# Patient Record
Sex: FEMALE | Race: OTHER | HISPANIC OR LATINO | Employment: UNEMPLOYED | URBAN - METROPOLITAN AREA
[De-identification: names, ages, dates, MRNs, and addresses within clinical notes are randomized per-mention and may not be internally consistent; named-entity substitution may affect disease eponyms.]

---

## 2023-09-30 ENCOUNTER — HOSPITAL ENCOUNTER (EMERGENCY)
Facility: HOSPITAL | Age: 28
Discharge: LEFT BEFORE DISCHARGE | End: 2023-10-01
Attending: EMERGENCY MEDICINE
Payer: MEDICAID

## 2023-09-30 VITALS
DIASTOLIC BLOOD PRESSURE: 58 MMHG | TEMPERATURE: 98.2 F | BODY MASS INDEX: 26.58 KG/M2 | SYSTOLIC BLOOD PRESSURE: 115 MMHG | WEIGHT: 150 LBS | HEIGHT: 63 IN | OXYGEN SATURATION: 98 % | HEART RATE: 76 BPM | RESPIRATION RATE: 18 BRPM

## 2023-09-30 DIAGNOSIS — N93.9 VAGINAL BLEEDING: Primary | ICD-10-CM

## 2023-09-30 PROCEDURE — 99283 EMERGENCY DEPT VISIT LOW MDM: CPT | Performed by: EMERGENCY MEDICINE

## 2023-09-30 ASSESSMENT — COLUMBIA-SUICIDE SEVERITY RATING SCALE - C-SSRS
1. IN THE PAST MONTH, HAVE YOU WISHED YOU WERE DEAD OR WISHED YOU COULD GO TO SLEEP AND NOT WAKE UP?: NO
2. HAVE YOU ACTUALLY HAD ANY THOUGHTS OF KILLING YOURSELF?: NO
6. HAVE YOU EVER DONE ANYTHING, STARTED TO DO ANYTHING, OR PREPARED TO DO ANYTHING TO END YOUR LIFE?: NO

## 2023-10-01 LAB
ABO GROUP (TYPE) IN BLOOD: NORMAL
ANTIBODY SCREEN: NORMAL
B-HCG SERPL-ACNC: 1151 MIU/ML
BASOPHILS # BLD AUTO: 0.05 X10*3/UL (ref 0–0.1)
BASOPHILS NFR BLD AUTO: 0.5 %
EOSINOPHIL # BLD AUTO: 0.1 X10*3/UL (ref 0–0.7)
EOSINOPHIL NFR BLD AUTO: 1.1 %
ERYTHROCYTE [DISTWIDTH] IN BLOOD BY AUTOMATED COUNT: 12.9 % (ref 11.5–14.5)
HCT VFR BLD AUTO: 38.9 % (ref 36–46)
HGB BLD-MCNC: 13.2 G/DL (ref 12–16)
IMM GRANULOCYTES # BLD AUTO: 0.02 X10*3/UL (ref 0–0.7)
IMM GRANULOCYTES NFR BLD AUTO: 0.2 % (ref 0–0.9)
LYMPHOCYTES # BLD AUTO: 4.07 X10*3/UL (ref 1.2–4.8)
LYMPHOCYTES NFR BLD AUTO: 43 %
MCH RBC QN AUTO: 31.3 PG (ref 26–34)
MCHC RBC AUTO-ENTMCNC: 33.9 G/DL (ref 32–36)
MCV RBC AUTO: 92 FL (ref 80–100)
MONOCYTES # BLD AUTO: 0.56 X10*3/UL (ref 0.1–1)
MONOCYTES NFR BLD AUTO: 5.9 %
NEUTROPHILS # BLD AUTO: 4.66 X10*3/UL (ref 1.2–7.7)
NEUTROPHILS NFR BLD AUTO: 49.3 %
NRBC BLD-RTO: 0 /100 WBCS (ref 0–0)
PLATELET # BLD AUTO: 244 X10*3/UL (ref 150–450)
PMV BLD AUTO: 10.6 FL (ref 7.5–11.5)
RBC # BLD AUTO: 4.22 X10*6/UL (ref 4–5.2)
RH FACTOR (ANTIGEN D): NORMAL
WBC # BLD AUTO: 9.5 X10*3/UL (ref 4.4–11.3)

## 2023-10-01 PROCEDURE — 36415 COLL VENOUS BLD VENIPUNCTURE: CPT

## 2023-10-01 PROCEDURE — 86850 RBC ANTIBODY SCREEN: CPT

## 2023-10-01 PROCEDURE — 85025 COMPLETE CBC W/AUTO DIFF WBC: CPT

## 2023-10-01 PROCEDURE — 84702 CHORIONIC GONADOTROPIN TEST: CPT

## 2023-10-01 NOTE — ED PROVIDER NOTES
HPI   Chief Complaint   Patient presents with    Vaginal Bleeding     RECENT MISCARRIAGE LAST WEEK. PT HAS CONSISTENT BLEEDING. DENIES DIZZINESS, SOB, CP.        Patient is a 28-year-old female who is presenting today for vaginal bleeding.  Patient was diagnosed with a miscarriage a week prior.  At that time her beta hCG was over 5000 and ultrasound showed a nonviable pregnancy.  She is reporting that her vaginal bleeding has persisted since then.  She denies abdominal cramping.  Denies abdominal trauma.  She notes that she has not been able to follow-up with OB/GYN since the miscarriage.  She has not received additional hCG testing.  She also notes that she is suffering from homelessness.  She has been dealing with food insecurity as well.      History provided by:  Patient                      No data recorded                Patient History   Past Medical History:   Diagnosis Date    Supervision of pregnancy with grand multiparity, third trimester 2019    High risk multigravida in third trimester    Supervision of pregnancy with history of pre-term labor, unspecified trimester 2014    Previous  delivery, antepartum     Past Surgical History:   Procedure Laterality Date    OTHER SURGICAL HISTORY  2014    Reported Prior Surgical / Procedural History     No family history on file.  Social History     Tobacco Use    Smoking status: Not on file    Smokeless tobacco: Not on file   Substance Use Topics    Alcohol use: Not on file    Drug use: Not on file       Physical Exam   ED Triage Vitals [23 2203]   Temp Heart Rate Resp BP   36.8 °C (98.2 °F) 76 18 115/58      SpO2 Temp src Heart Rate Source Patient Position   98 % -- -- --      BP Location FiO2 (%)     -- --       Physical Exam  Constitutional:       General: She is not in acute distress.  HENT:      Head: Normocephalic.   Cardiovascular:      Rate and Rhythm: Normal rate.   Pulmonary:      Effort: Pulmonary effort is normal. No  respiratory distress.   Abdominal:      General: Abdomen is flat. There is no distension.      Tenderness: There is no abdominal tenderness. There is no guarding.   Musculoskeletal:         General: Normal range of motion.   Skin:     General: Skin is dry.   Neurological:      Mental Status: She is alert and oriented to person, place, and time. Mental status is at baseline.         ED Course & MDM   Diagnoses as of 10/01/23 0255   Vaginal bleeding       Medical Decision Making  Patient is a 28-year-old female who is presenting today for vaginal bleeding following recent miscarriage.  Patient did not follow-up with OB/GYN afterwards.  Concern for retained placenta versus incomplete miscarriage.  Plan was to obtain a transvaginal ultrasound to look for retained placenta.  Lab work was obtained including a beta-hCG type and screen and CBC.  Patient's lab work was drawn but not resulted.  Patient was informed about the transvaginal ultrasound.  At that time patient became upset about waiting for an ultrasound and refused.  After discussion with the patient, they elected to leave the hospital AGAINST MEDICAL ADVICE.      The patient appeared to have intact insight, judgment, and reason. In my medical opinion, the patient has the capacity to make medical decisions.   I discussed my concerns for the patient's health given that a full evaluation and treatment has not yet been completed. Counseled the patient regarding the risks of leaving AMA including possible death, permanent disability, prolonged hospitalization, and prolonged illness.     I discussed the specific benefits of additional evaluation/treatment and offered alternative options in the hopes that the patient might be amenable to partial evaluation and treatment which would be medically beneficial. Ultimately, the patient elected to leave AM. I answered all of their questions about their medical condition and counseled them to return to the ED as soon as  possible to complete their evaluation, particularly if his symptoms worsen or do not improve. I emphasized that leaving AMA would not preclude him from returning to the hospital for further evaluation.     The patient completed the AGAINST MEDICAL ADVICE form.    The patient was given strict return instructions to seek further treatment in the emergency department as soon as possible.    The patient expressed understanding and was in agreement with this plan.    Vale Anderson MD   Emergency Medicine PGY-2         Procedure  Procedures     Vale Anderson MD  Resident  10/01/23 0131       Vale Anderson MD  Resident  10/01/23 0254       Vale Anderson MD  Resident  10/01/23 0255

## 2023-10-30 ENCOUNTER — APPOINTMENT (OUTPATIENT)
Dept: RADIOLOGY | Facility: HOSPITAL | Age: 28
End: 2023-10-30
Payer: MEDICAID

## 2023-10-30 ENCOUNTER — HOSPITAL ENCOUNTER (EMERGENCY)
Facility: HOSPITAL | Age: 28
Discharge: HOME | End: 2023-10-31
Attending: EMERGENCY MEDICINE
Payer: MEDICAID

## 2023-10-30 DIAGNOSIS — N93.9 ABNORMAL UTERINE BLEEDING: Primary | ICD-10-CM

## 2023-10-30 DIAGNOSIS — A53.9 SYPHILIS: ICD-10-CM

## 2023-10-30 LAB
ALBUMIN SERPL BCP-MCNC: 4.2 G/DL (ref 3.4–5)
ALP SERPL-CCNC: 82 U/L (ref 33–110)
ALT SERPL W P-5'-P-CCNC: 17 U/L (ref 7–45)
ANION GAP SERPL CALC-SCNC: 16 MMOL/L (ref 10–20)
AST SERPL W P-5'-P-CCNC: 17 U/L (ref 9–39)
B-HCG SERPL-ACNC: <3 MIU/ML
BASOPHILS # BLD AUTO: 0.05 X10*3/UL (ref 0–0.1)
BASOPHILS NFR BLD AUTO: 0.8 %
BILIRUB SERPL-MCNC: 0.4 MG/DL (ref 0–1.2)
BUN SERPL-MCNC: 9 MG/DL (ref 6–23)
CALCIUM SERPL-MCNC: 10 MG/DL (ref 8.6–10.6)
CHLORIDE SERPL-SCNC: 109 MMOL/L (ref 98–107)
CLUE CELLS SPEC QL WET PREP: PRESENT
CO2 SERPL-SCNC: 21 MMOL/L (ref 21–32)
CREAT SERPL-MCNC: 0.75 MG/DL (ref 0.5–1.05)
EOSINOPHIL # BLD AUTO: 0.11 X10*3/UL (ref 0–0.7)
EOSINOPHIL NFR BLD AUTO: 1.7 %
ERYTHROCYTE [DISTWIDTH] IN BLOOD BY AUTOMATED COUNT: 12.3 % (ref 11.5–14.5)
GFR SERPL CREATININE-BSD FRML MDRD: >90 ML/MIN/1.73M*2
GLUCOSE SERPL-MCNC: 65 MG/DL (ref 74–99)
HCT VFR BLD AUTO: 45.6 % (ref 36–46)
HGB BLD-MCNC: 15.3 G/DL (ref 12–16)
HIV 1+2 AB+HIV1 P24 AG SERPL QL IA: NONREACTIVE
IMM GRANULOCYTES # BLD AUTO: 0.02 X10*3/UL (ref 0–0.7)
IMM GRANULOCYTES NFR BLD AUTO: 0.3 % (ref 0–0.9)
LYMPHOCYTES # BLD AUTO: 2.27 X10*3/UL (ref 1.2–4.8)
LYMPHOCYTES NFR BLD AUTO: 34.4 %
MCH RBC QN AUTO: 31.4 PG (ref 26–34)
MCHC RBC AUTO-ENTMCNC: 33.6 G/DL (ref 32–36)
MCV RBC AUTO: 94 FL (ref 80–100)
MONOCYTES # BLD AUTO: 0.59 X10*3/UL (ref 0.1–1)
MONOCYTES NFR BLD AUTO: 8.9 %
NEUTROPHILS # BLD AUTO: 3.56 X10*3/UL (ref 1.2–7.7)
NEUTROPHILS NFR BLD AUTO: 53.9 %
NRBC BLD-RTO: 0 /100 WBCS (ref 0–0)
PLATELET # BLD AUTO: 310 X10*3/UL (ref 150–450)
PMV BLD AUTO: 10.5 FL (ref 7.5–11.5)
POTASSIUM SERPL-SCNC: 4 MMOL/L (ref 3.5–5.3)
PROT SERPL-MCNC: 7.6 G/DL (ref 6.4–8.2)
RBC # BLD AUTO: 4.87 X10*6/UL (ref 4–5.2)
SARS-COV-2 RNA RESP QL NAA+PROBE: NOT DETECTED
SODIUM SERPL-SCNC: 142 MMOL/L (ref 136–145)
T PALLIDUM AB SER QL: REACTIVE
T VAGINALIS SPEC QL WET PREP: ABNORMAL
TRICHOMONAS REFLEX COMMENT: ABNORMAL
WBC # BLD AUTO: 6.6 X10*3/UL (ref 4.4–11.3)
WBC VAG QL WET PREP: ABNORMAL
YEAST VAG QL WET PREP: ABNORMAL

## 2023-10-30 PROCEDURE — 36415 COLL VENOUS BLD VENIPUNCTURE: CPT

## 2023-10-30 PROCEDURE — 96372 THER/PROPH/DIAG INJ SC/IM: CPT

## 2023-10-30 PROCEDURE — S4991 NICOTINE PATCH NONLEGEND: HCPCS | Mod: SE | Performed by: EMERGENCY MEDICINE

## 2023-10-30 PROCEDURE — 87661 TRICHOMONAS VAGINALIS AMPLIF: CPT | Mod: 59

## 2023-10-30 PROCEDURE — 86780 TREPONEMA PALLIDUM: CPT

## 2023-10-30 PROCEDURE — 84702 CHORIONIC GONADOTROPIN TEST: CPT

## 2023-10-30 PROCEDURE — 2500000004 HC RX 250 GENERAL PHARMACY W/ HCPCS (ALT 636 FOR OP/ED): Mod: SE

## 2023-10-30 PROCEDURE — 86318 IA INFECTIOUS AGENT ANTIBODY: CPT

## 2023-10-30 PROCEDURE — 87389 HIV-1 AG W/HIV-1&-2 AB AG IA: CPT

## 2023-10-30 PROCEDURE — 2500000002 HC RX 250 W HCPCS SELF ADMINISTERED DRUGS (ALT 637 FOR MEDICARE OP, ALT 636 FOR OP/ED): Mod: SE | Performed by: EMERGENCY MEDICINE

## 2023-10-30 PROCEDURE — 80053 COMPREHEN METABOLIC PANEL: CPT

## 2023-10-30 PROCEDURE — 87210 SMEAR WET MOUNT SALINE/INK: CPT | Mod: 59

## 2023-10-30 PROCEDURE — 87800 DETECT AGNT MULT DNA DIREC: CPT

## 2023-10-30 PROCEDURE — 99285 EMERGENCY DEPT VISIT HI MDM: CPT

## 2023-10-30 PROCEDURE — 85025 COMPLETE CBC W/AUTO DIFF WBC: CPT

## 2023-10-30 PROCEDURE — 76830 TRANSVAGINAL US NON-OB: CPT

## 2023-10-30 PROCEDURE — 2500000001 HC RX 250 WO HCPCS SELF ADMINISTERED DRUGS (ALT 637 FOR MEDICARE OP): Mod: SE

## 2023-10-30 PROCEDURE — 76815 OB US LIMITED FETUS(S): CPT | Mod: GC | Performed by: RADIOLOGY

## 2023-10-30 PROCEDURE — 86780 TREPONEMA PALLIDUM: CPT | Mod: 59

## 2023-10-30 PROCEDURE — 87635 SARS-COV-2 COVID-19 AMP PRB: CPT

## 2023-10-30 PROCEDURE — 99284 EMERGENCY DEPT VISIT MOD MDM: CPT | Mod: 25 | Performed by: EMERGENCY MEDICINE

## 2023-10-30 RX ORDER — DOXYCYCLINE HYCLATE 100 MG
100 TABLET ORAL ONCE
Status: COMPLETED | OUTPATIENT
Start: 2023-10-30 | End: 2023-10-30

## 2023-10-30 RX ORDER — CEFTRIAXONE 500 MG/1
500 INJECTION, POWDER, FOR SOLUTION INTRAMUSCULAR; INTRAVENOUS ONCE
Status: COMPLETED | OUTPATIENT
Start: 2023-10-30 | End: 2023-10-30

## 2023-10-30 RX ORDER — METRONIDAZOLE 500 MG/1
500 TABLET ORAL 3 TIMES DAILY
Qty: 21 TABLET | Refills: 0 | Status: SHIPPED | OUTPATIENT
Start: 2023-10-30 | End: 2023-11-06

## 2023-10-30 RX ORDER — IBUPROFEN 200 MG
1 TABLET ORAL DAILY
Status: DISCONTINUED | OUTPATIENT
Start: 2023-10-30 | End: 2023-10-31 | Stop reason: HOSPADM

## 2023-10-30 RX ORDER — METRONIDAZOLE 500 MG/1
500 TABLET ORAL 3 TIMES DAILY
Qty: 21 TABLET | Refills: 0 | Status: SHIPPED | OUTPATIENT
Start: 2023-10-30 | End: 2023-10-30 | Stop reason: SDUPTHER

## 2023-10-30 RX ORDER — DIPHENHYDRAMINE HCL 25 MG
25 CAPSULE ORAL ONCE
Status: COMPLETED | OUTPATIENT
Start: 2023-10-30 | End: 2023-10-30

## 2023-10-30 RX ORDER — METRONIDAZOLE 500 MG/1
500 TABLET ORAL ONCE
Status: COMPLETED | OUTPATIENT
Start: 2023-10-30 | End: 2023-10-30

## 2023-10-30 RX ORDER — DOXYCYCLINE 100 MG/1
100 CAPSULE ORAL 2 TIMES DAILY
Qty: 20 CAPSULE | Refills: 0 | Status: SHIPPED | OUTPATIENT
Start: 2023-10-30 | End: 2023-11-09

## 2023-10-30 RX ORDER — DOXYCYCLINE 100 MG/1
100 CAPSULE ORAL 2 TIMES DAILY
Qty: 20 CAPSULE | Refills: 0 | Status: SHIPPED | OUTPATIENT
Start: 2023-10-30 | End: 2023-10-30 | Stop reason: SDUPTHER

## 2023-10-30 RX ADMIN — DIPHENHYDRAMINE HYDROCHLORIDE 25 MG: 25 CAPSULE ORAL at 21:25

## 2023-10-30 RX ADMIN — CEFTRIAXONE SODIUM 500 MG: 500 INJECTION, POWDER, FOR SOLUTION INTRAMUSCULAR; INTRAVENOUS at 15:24

## 2023-10-30 RX ADMIN — METRONIDAZOLE 500 MG: 500 TABLET, FILM COATED ORAL at 15:30

## 2023-10-30 RX ADMIN — DOXYCYCLINE HYCLATE 100 MG: 100 TABLET, FILM COATED ORAL at 15:24

## 2023-10-30 RX ADMIN — NICOTINE 1 PATCH: 21 PATCH, EXTENDED RELEASE TRANSDERMAL at 17:24

## 2023-10-30 ASSESSMENT — LIFESTYLE VARIABLES
EVER FELT BAD OR GUILTY ABOUT YOUR DRINKING: NO
HAVE YOU EVER FELT YOU SHOULD CUT DOWN ON YOUR DRINKING: NO
REASON UNABLE TO ASSESS: NO
EVER HAD A DRINK FIRST THING IN THE MORNING TO STEADY YOUR NERVES TO GET RID OF A HANGOVER: NO
HAVE PEOPLE ANNOYED YOU BY CRITICIZING YOUR DRINKING: NO

## 2023-10-30 ASSESSMENT — PAIN - FUNCTIONAL ASSESSMENT: PAIN_FUNCTIONAL_ASSESSMENT: 0-10

## 2023-10-30 ASSESSMENT — COLUMBIA-SUICIDE SEVERITY RATING SCALE - C-SSRS
2. HAVE YOU ACTUALLY HAD ANY THOUGHTS OF KILLING YOURSELF?: NO
6. HAVE YOU EVER DONE ANYTHING, STARTED TO DO ANYTHING, OR PREPARED TO DO ANYTHING TO END YOUR LIFE?: NO
1. IN THE PAST MONTH, HAVE YOU WISHED YOU WERE DEAD OR WISHED YOU COULD GO TO SLEEP AND NOT WAKE UP?: NO

## 2023-10-30 ASSESSMENT — PAIN SCALES - GENERAL: PAINLEVEL_OUTOF10: 0 - NO PAIN

## 2023-10-30 NOTE — ED PROVIDER NOTES
HPI   Chief Complaint   Patient presents with    Vaginal Bleeding       Limitations to History: None    HPI: This is a 28-year-old female with a recent miscarriage and homelessness who presents to the emergency room with concerns for vaginal bleeding.  Patient states that she had a miscarriage 1 month ago and has not stopped bleeding since then.  Patient was evaluated in this emergency room on 9/30/2023 for the same complaint.  At that time, her quantitative hCG was uptrending and she did leave AGAINST MEDICAL ADVICE prior to transvaginal ultrasound.  Patient denies any abdominal pain but does endorse continued bleeding.  Patient states that she does not have any female sanitary products and is getting itchy because of all the dried blood.  Patient does endorse nasal congestion but denies cough, fevers, or chills.  Patient denies any sore throat.  Patient denies any urinary symptoms such as frequency, hesitancy, dysuria, or hematuria.  Patient is agreeable to STD testing at this time including HIV and syphilis.  Patient would like to be preemptively treated.    Additional History Obtained from: None    12 point review of systems was performed and is negative unless otherwise specified    ------------------------------------------------------------------------------------------------------------------------------------------  Physical Exam:    VS: As documented in the triage note and EMR flowsheet from this visit were reviewed.    CONSTITUTIONAL: Well appearing, well nourished, awake, alert, oriented to person, place, time/situation and in no apparent distress.   EYES: Clear bilaterally, pupils equal, round and reactive to light.  ENT: Oropharynx is visualized to be nonerythematous.  There is no tonsillar exudates or swelling.  No trismus or uvula deviation.  No tenderness to palpation over the trachea.  Patient is able to swallow effectively.  CARDIOVASCULAR: Normal rate, regular rhythm.  Heart sounds S1, S2.  No  murmurs, rubs or gallops.  RESPIRATORY: Breath sounds clear and equal bilaterally. No wheezes or rhonchi.   GASTROINTESTINAL: Abdomen soft, non-distended, no rebound, no guarding.  No tenderness to palpation in all 4 quadrants.  No suprapubic or CVA tenderness.  Negative McBurney's point, Rovsing, psoas, Whitehead sign.  : A pelvic exam was performed on patient.  A female chaperone in the room was Nikki GRIFFIN.  There is normal external vaginal anatomy with warm mucous membranes of the inner vaginal walls.  The cervical os is visualized to be closed.  There is minimal blood draining from the cervical os.  No pooling of blood or discharge in the vaginal vault.  On bimanual exam, there is no cervical motion tenderness or adnexal pain.  MUSCULOSKELETAL: Spine appears normal, range of motion is not limited, no muscle or joint tenderness.   NEUROLOGICAL: Alert and oriented, no focal deficits, no motor or sensory deficits.   SKIN: Skin normal color for race, warm, dry and intact. No evidence of trauma.   PSYCHIATRIC: Alert and oriented to person, place, time/situation. normal mood and affect. No apparent risk to self or others.     ------------------------------------------------------------------------------------------------------------------------------------------    Medical Decision Making:    This is a 28-year-old female with a recent miscarriage who presents today with continued uterine bleeding.  Patient remained stable throughout course of care.  Abdominal exam is benign lowers my concern for cholecystitis, pancreatitis, appendicitis, or bowel obstruction.  Pelvic exam is benign and lowers my concern for TOA, torsion, ectopic pregnancy, or PID.  Given that patient's quantitative hCG was uptrending from last visit, a transvaginal ultrasound was ordered.  CBC with differential, CMP, quantitative hCG, chlamydia, HIV, syphilis, trichomonas, urinalysis with reflex to culture, and wet prep was ordered. CBC and CMP were  unremarkable.  Wet prep was positive for clue cells, suspicious for bacterial vaginosis.  Quantitative hCG was less than 3 which is reassuring for a completed miscarriage.  Ultrasound of the pelvis was unremarkable for retained products of conception.  Patient will be discharged home with prescriptions for doxycycline and Flagyl.  Patient did state that she is homeless and does endorse crack cocaine use.  Patient last used 2 days ago.  Patient is requesting to speak to [facility] at this time.  [facility] did discuss this case with patient and the decision was made to discharge patient home to a drug rehab facility.  I did involve social work because patient is homeless and does not have access to female hygiene products.  Social work worked with patient to provide some for her.  Ultimately, patient will need to follow-up with OB/GYN for abnormal uterine bleeding.  Patient was agreeable to this plan had no further questions.  I did write patient with numbers for the [facility] as well as the OB/GYN clinic for outpatient follow-up.  Patient will be discharged home.  Patient understands that if symptoms worsen or change in any way, they should return for immediate medical attention.  Patient understands that they should follow-up with their primary care physician within the next week to follow-up for abnormal uterine bleeding, drug abuse.  Patient was stable upon discharge.      External Records Reviewed: I reviewed recent and relevant outside records including: Previous provider notes    Independent Interpretation of Studies:  I independently interpreted: Transvaginal ultrasound    Escalation of Care:  Appropriate for outpatient follow-up with primary care provider, OB/GYN    Social Determinants Affecting Care:  Homelessness, drug abuse    Prescription Drug Consideration: Flagyl, doxycycline      Discussion of Management with Other Providers:   I discussed the patient/results with: Supervising attending      Armaan  LAUREN Vaz PA-C  [facility]                            Bernard Coma Scale Score: 15                  Patient History   Past Medical History:   Diagnosis Date    Supervision of pregnancy with grand multiparity, third trimester 2019    High risk multigravida in third trimester    Supervision of pregnancy with history of pre-term labor, unspecified trimester 2014    Previous  delivery, antepartum     Past Surgical History:   Procedure Laterality Date    OTHER SURGICAL HISTORY  2014    Reported Prior Surgical / Procedural History     No family history on file.  Social History     Tobacco Use    Smoking status: Not on file    Smokeless tobacco: Not on file   Substance Use Topics    Alcohol use: Not on file    Drug use: Not on file       Physical Exam   ED Triage Vitals [10/30/23 1231]   Temp Heart Rate Resp BP   36.4 °C (97.6 °F) (!) 118 16 132/64      SpO2 Temp Source Heart Rate Source Patient Position   96 % Oral Monitor --      BP Location FiO2 (%)     Right arm --       Physical Exam    ED Course & MDM        Medical Decision Making      Procedure  Procedures     Armaan Vaz PA-C  10/30/23 8006

## 2023-10-30 NOTE — ED TRIAGE NOTES
"Pt brought in by EMS, for c/o being homeless and wanting assistance with \"hygiene while on period\". Pt denies any weakness, pain or other complaints. Pt states \"I just want to eat\"    Pt told provider upon arrival that she had a miscarriage 1 month ago and has not stopped bleeding since.    "

## 2023-10-31 ENCOUNTER — PHARMACY VISIT (OUTPATIENT)
Dept: PHARMACY | Facility: CLINIC | Age: 28
End: 2023-10-31
Payer: MEDICAID

## 2023-10-31 VITALS
HEIGHT: 63 IN | DIASTOLIC BLOOD PRESSURE: 84 MMHG | WEIGHT: 220 LBS | SYSTOLIC BLOOD PRESSURE: 120 MMHG | HEART RATE: 85 BPM | BODY MASS INDEX: 38.98 KG/M2 | OXYGEN SATURATION: 97 % | RESPIRATION RATE: 16 BRPM | TEMPERATURE: 97.9 F

## 2023-10-31 LAB
C TRACH RRNA SPEC QL NAA+PROBE: NEGATIVE
GLUCOSE BLD MANUAL STRIP-MCNC: 110 MG/DL (ref 74–99)
N GONORRHOEA DNA SPEC QL PROBE+SIG AMP: NEGATIVE
RPR SER QL: NONREACTIVE

## 2023-10-31 PROCEDURE — RXMED WILLOW AMBULATORY MEDICATION CHARGE

## 2023-10-31 PROCEDURE — 82947 ASSAY GLUCOSE BLOOD QUANT: CPT

## 2023-10-31 PROCEDURE — 2500000004 HC RX 250 GENERAL PHARMACY W/ HCPCS (ALT 636 FOR OP/ED): Mod: JZ,SE

## 2023-10-31 RX ORDER — DOXYCYCLINE 100 MG/1
100 CAPSULE ORAL 2 TIMES DAILY
Qty: 20 CAPSULE | Refills: 0 | OUTPATIENT
Start: 2023-10-31 | End: 2024-04-06 | Stop reason: HOSPADM

## 2023-10-31 RX ORDER — METRONIDAZOLE 500 MG/1
500 TABLET ORAL
Qty: 21 TABLET | Refills: 0 | OUTPATIENT
Start: 2023-10-31 | End: 2024-04-06 | Stop reason: HOSPADM

## 2023-10-31 RX ADMIN — PENICILLIN G BENZATHINE 2.4 MILLION UNITS: 1200000 INJECTION, SUSPENSION INTRAMUSCULAR at 01:16

## 2023-10-31 NOTE — PROGRESS NOTES
Confirmed patient is ready for discharge and [facility] ready for patient. Transportation being arranged and  [facility] program will provide patient her medication prior to discharge.    RADHA TORRE

## 2023-10-31 NOTE — PROGRESS NOTES
Late entry- saw patient 10/30/23 Asked to see patient by Provider for help obtaining feminine  hygiene products as well as with substance abuse issue. Pt reports being homeless at this time. Patient provided with hygiene products and [facility] saw patient who arranged placement with [facility]. Unfortunately they cannot accept pt until 11 am today (10/31/23) Pt and provider aware. Center will arrange ride for patient to center. Department will follow.  Louis Knight Transitional care Coordinator

## 2023-10-31 NOTE — ED NOTES
[facility] ED NOTE 10/31/23    Patient is being provide several appts PCP @ GYN  PCP- 11/2/23 @ 4p  [facility] [id number]  With Dr Oneida Campos  [phone]    GYN- 11/21/23 @ 2p  [address]  With Elizabeth Robles    Patient has appointment reminders on profile     [facility] Amado, [facility]  10/31/23 8316

## 2023-10-31 NOTE — PROGRESS NOTES
Emergency Medicine Transition of Care Note.    I received Leigh Ann Joy in signout from Physician Assistant Milind.  Please see the previous ED provider note for all HPI, PE and MDM up to the time of signout at 0700. This is in addition to the primary record.    In brief Leigh Ann Joy is an 28 y.o. female presenting for   Chief Complaint   Patient presents with    Vaginal Bleeding       Diagnoses as of 10/31/23 0742   Abnormal uterine bleeding   Syphilis       Medical Decision Making    At the time of signout we were awaiting: Patient is ready for discharge at time of signout.  She will wait in the waiting area for [facility] to speak to her at 11 AM regarding her cocaine use.  She is medically cleared.  She already has a prescription in her chart for doxycycline, metronidazole for findings of bacterial vaginosis.  She has been already previously cleared by the previous ED team for her vaginal bleeding with a negative hCG at this time and ultrasound that did not reveal any sign of products of conception.  She is to follow-up with the OB/GYN clinic as well as PCP.  Stable for discharge at this time.    Final diagnoses:   [N93.9] Abnormal uterine bleeding   [A53.9] Syphilis           Procedure  Procedures    Kory Heard DO     Reviewed and approved by KORY HEARD on 10/31/23 at 7:42 AM.

## 2023-11-01 LAB
T PALLIDUM AB SER QL AGGL: REACTIVE
T VAGINALIS RRNA SPEC QL NAA+PROBE: POSITIVE

## 2023-11-02 ENCOUNTER — APPOINTMENT (OUTPATIENT)
Dept: PRIMARY CARE | Facility: HOSPITAL | Age: 28
End: 2023-11-02
Payer: MEDICAID

## 2023-11-02 ENCOUNTER — DOCUMENTATION (OUTPATIENT)
Dept: HOME HEALTH SERVICES | Age: 28
End: 2023-11-02

## 2023-11-02 NOTE — PROGRESS NOTES
Pt. Was discharged to [facility]- placed a call there at 1300 on 11/2/23 to [phone] to inform the patient of her positive syphilis antibody and TPPA results and negative RPR results and to follow up with her doctor to get appropriate treatment if not treated in the past. I was  told that someone from the Center would call back sometimes in 24 hours.  Awaiting for a return call from the [facility].

## 2023-11-03 ENCOUNTER — DOCUMENTATION (OUTPATIENT)
Dept: HOME HEALTH SERVICES | Age: 28
End: 2023-11-03

## 2023-11-03 PROBLEM — O99.210 OBESITY IN PREGNANCY (HHS-HCC): Status: ACTIVE | Noted: 2023-11-03

## 2023-11-03 PROBLEM — G40.109 EPILEPSY, PARTIAL (MULTI): Status: ACTIVE | Noted: 2023-11-03

## 2023-11-03 PROBLEM — J39.8 CONGESTION OF UPPER RESPIRATORY TRACT: Status: ACTIVE | Noted: 2023-11-03

## 2023-11-03 PROBLEM — O09.30 LATE PRENATAL CARE (HHS-HCC): Status: ACTIVE | Noted: 2023-11-03

## 2023-11-03 PROBLEM — A59.9 TRICHIMONIASIS: Status: ACTIVE | Noted: 2023-11-03

## 2023-11-03 PROBLEM — F17.200 SMOKER: Status: ACTIVE | Noted: 2023-11-03

## 2023-11-03 PROBLEM — O09.90 HIGH-RISK PREGNANCY (HHS-HCC): Status: ACTIVE | Noted: 2023-11-03

## 2023-11-03 PROBLEM — O91.112: Status: ACTIVE | Noted: 2023-11-03

## 2023-11-03 PROBLEM — O99.891 BACK PAIN AFFECTING PREGNANCY IN SECOND TRIMESTER (HHS-HCC): Status: ACTIVE | Noted: 2023-11-03

## 2023-11-03 PROBLEM — O23.42 URINARY TRACT INFECTION IN MOTHER DURING SECOND TRIMESTER OF PREGNANCY (HHS-HCC): Status: ACTIVE | Noted: 2023-11-03

## 2023-11-03 PROBLEM — F12.91 HISTORY OF MARIJUANA USE: Status: ACTIVE | Noted: 2023-11-03

## 2023-11-03 PROBLEM — M54.9 BACK PAIN AFFECTING PREGNANCY IN SECOND TRIMESTER (HHS-HCC): Status: ACTIVE | Noted: 2023-11-03

## 2023-11-03 PROBLEM — G40.909 SEIZURE DISORDER (MULTI): Status: ACTIVE | Noted: 2023-11-03

## 2023-11-03 RX ORDER — PRENATAL VIT,CAL 76/IRON/FOLIC 29 MG-1 MG
1 TABLET ORAL DAILY
Status: ON HOLD | COMMUNITY
Start: 2021-05-20 | End: 2024-04-06 | Stop reason: WASHOUT

## 2023-11-03 RX ORDER — GUAIFENESIN 400 MG/1
400 TABLET ORAL EVERY 4 HOURS PRN
Status: ON HOLD | COMMUNITY
Start: 2018-12-17 | End: 2024-04-06 | Stop reason: WASHOUT

## 2023-11-03 RX ORDER — DOCUSATE SODIUM 100 MG/1
100 CAPSULE, LIQUID FILLED ORAL 2 TIMES DAILY PRN
Status: ON HOLD | COMMUNITY
Start: 2021-10-26 | End: 2024-04-06 | Stop reason: WASHOUT

## 2023-11-03 RX ORDER — IBUPROFEN 800 MG/1
800 TABLET ORAL EVERY 6 HOURS
Status: ON HOLD | COMMUNITY
Start: 2023-01-15 | End: 2024-04-06 | Stop reason: WASHOUT

## 2023-11-03 RX ORDER — ACETAMINOPHEN 325 MG/1
325-650 TABLET ORAL EVERY 6 HOURS PRN
Status: ON HOLD | COMMUNITY
Start: 2021-05-20 | End: 2024-04-06 | Stop reason: WASHOUT

## 2023-11-03 RX ORDER — VITAMIN A ACETATE, BETA CAROTENE, ASCORBIC ACID, CHOLECALCIFEROL, .ALPHA.-TOCOPHEROL ACETATE, DL-, THIAMINE MONONITRATE, RIBOFLAVIN, NIACINAMIDE, PYRIDOXINE HYDROCHLORIDE, FOLIC ACID, CYANOCOBALAMIN, CALCIUM CARBONATE, FERROUS FUMARATE, ZINC OXIDE, CUPRIC OXIDE 3080; 12; 120; 400; 1; 1.84; 3; 20; 22; 920; 25; 200; 27; 10; 2 [IU]/1; UG/1; MG/1; [IU]/1; MG/1; MG/1; MG/1; MG/1; MG/1; [IU]/1; MG/1; MG/1; MG/1; MG/1; MG/1
1 TABLET, FILM COATED ORAL DAILY
COMMUNITY
End: 2024-04-06 | Stop reason: HOSPADM

## 2023-11-03 RX ORDER — IBUPROFEN 600 MG/1
600 TABLET ORAL EVERY 6 HOURS PRN
Status: ON HOLD | COMMUNITY
Start: 2021-10-26 | End: 2024-04-06 | Stop reason: WASHOUT

## 2023-11-03 NOTE — PROGRESS NOTES
This pt. called me back from the [facility] on 11/3/23 at 1331 and after proper ID was obtained she denied any h/o syphilis and did say she received 2 PCN shots at .   I informed the patient of the +syphilis results and they confirmed again that they have never had syphilis or treated for it in the past and denied any rash or lesion.  I instructed them since they said they were treated with 2 PCN shots that they will need to follow up for any further treatment that they may need more than one treatment.  I explained to them about the syphilis antibody always being positive and that they should include that in their medical history if asked. I instructed them that their partner(s) need to be informed to get checked and treated that they were exposed to syphilis. The pt. agreed to follow up with their doctor they stated.  I also informed them that the [facility] may contact them as it is a reportable result.  I answered all questions and gave them the number to the ID clinic at [phone] as well.

## 2024-03-18 ENCOUNTER — HOSPITAL ENCOUNTER (EMERGENCY)
Facility: HOSPITAL | Age: 29
Discharge: HOME | End: 2024-03-18
Payer: MEDICAID

## 2024-03-18 VITALS
WEIGHT: 220 LBS | HEART RATE: 97 BPM | DIASTOLIC BLOOD PRESSURE: 79 MMHG | SYSTOLIC BLOOD PRESSURE: 143 MMHG | BODY MASS INDEX: 38.98 KG/M2 | HEIGHT: 63 IN | TEMPERATURE: 97.3 F | OXYGEN SATURATION: 98 % | RESPIRATION RATE: 18 BRPM

## 2024-03-18 DIAGNOSIS — R21 RASH AND OTHER NONSPECIFIC SKIN ERUPTION: Primary | ICD-10-CM

## 2024-03-18 LAB — TREPONEMA PALLIDUM IGG+IGM AB [PRESENCE] IN SERUM OR PLASMA BY IMMUNOASSAY: REACTIVE

## 2024-03-18 PROCEDURE — 86780 TREPONEMA PALLIDUM: CPT

## 2024-03-18 PROCEDURE — 2500000001 HC RX 250 WO HCPCS SELF ADMINISTERED DRUGS (ALT 637 FOR MEDICARE OP): Mod: SE

## 2024-03-18 PROCEDURE — 96372 THER/PROPH/DIAG INJ SC/IM: CPT

## 2024-03-18 PROCEDURE — 99284 EMERGENCY DEPT VISIT MOD MDM: CPT

## 2024-03-18 PROCEDURE — 99283 EMERGENCY DEPT VISIT LOW MDM: CPT

## 2024-03-18 PROCEDURE — 36415 COLL VENOUS BLD VENIPUNCTURE: CPT

## 2024-03-18 PROCEDURE — 2500000004 HC RX 250 GENERAL PHARMACY W/ HCPCS (ALT 636 FOR OP/ED): Mod: SE

## 2024-03-18 PROCEDURE — 86318 IA INFECTIOUS AGENT ANTIBODY: CPT

## 2024-03-18 RX ORDER — DIPHENHYDRAMINE HCL 25 MG
25 CAPSULE ORAL ONCE
Status: COMPLETED | OUTPATIENT
Start: 2024-03-18 | End: 2024-03-18

## 2024-03-18 RX ORDER — DOXYCYCLINE HYCLATE 100 MG
100 TABLET ORAL ONCE
Status: COMPLETED | OUTPATIENT
Start: 2024-03-18 | End: 2024-03-18

## 2024-03-18 RX ADMIN — DOXYCYCLINE HYCLATE 100 MG: 100 TABLET, COATED ORAL at 12:42

## 2024-03-18 RX ADMIN — PREDNISONE 50 MG: 20 TABLET ORAL at 11:35

## 2024-03-18 RX ADMIN — DIPHENHYDRAMINE HYDROCHLORIDE 25 MG: 25 CAPSULE ORAL at 11:35

## 2024-03-18 RX ADMIN — PENICILLIN G BENZATHINE 2.4 MILLION UNITS: 1200000 INJECTION, SUSPENSION INTRAMUSCULAR at 12:42

## 2024-03-18 SDOH — ECONOMIC STABILITY: HOUSING INSECURITY
IN THE LAST 12 MONTHS, WAS THERE A TIME WHEN YOU DID NOT HAVE A STEADY PLACE TO SLEEP OR SLEPT IN A SHELTER (INCLUDING NOW)?: YES

## 2024-03-18 SDOH — ECONOMIC STABILITY: TRANSPORTATION INSECURITY
IN THE PAST 12 MONTHS, HAS THE LACK OF TRANSPORTATION KEPT YOU FROM MEDICAL APPOINTMENTS OR FROM GETTING MEDICATIONS?: YES

## 2024-03-18 SDOH — ECONOMIC STABILITY: FOOD INSECURITY: WITHIN THE PAST 12 MONTHS, THE FOOD YOU BOUGHT JUST DIDN'T LAST AND YOU DIDN'T HAVE MONEY TO GET MORE.: OFTEN TRUE

## 2024-03-18 SDOH — ECONOMIC STABILITY: TRANSPORTATION INSECURITY
IN THE PAST 12 MONTHS, HAS LACK OF TRANSPORTATION KEPT YOU FROM MEETINGS, WORK, OR FROM GETTING THINGS NEEDED FOR DAILY LIVING?: YES

## 2024-03-18 SDOH — ECONOMIC STABILITY: FOOD INSECURITY: WITHIN THE PAST 12 MONTHS, YOU WORRIED THAT YOUR FOOD WOULD RUN OUT BEFORE YOU GOT MONEY TO BUY MORE.: OFTEN TRUE

## 2024-03-18 SDOH — ECONOMIC STABILITY: INCOME INSECURITY: IN THE LAST 12 MONTHS, WAS THERE A TIME WHEN YOU WERE NOT ABLE TO PAY THE MORTGAGE OR RENT ON TIME?: YES

## 2024-03-18 SDOH — ECONOMIC STABILITY: INCOME INSECURITY: HOW HARD IS IT FOR YOU TO PAY FOR THE VERY BASICS LIKE FOOD, HOUSING, MEDICAL CARE, AND HEATING?: VERY HARD

## 2024-03-18 SDOH — ECONOMIC STABILITY: HOUSING INSECURITY: IN THE LAST 12 MONTHS, HOW MANY PLACES HAVE YOU LIVED?: 5

## 2024-03-18 ASSESSMENT — COLUMBIA-SUICIDE SEVERITY RATING SCALE - C-SSRS
1. IN THE PAST MONTH, HAVE YOU WISHED YOU WERE DEAD OR WISHED YOU COULD GO TO SLEEP AND NOT WAKE UP?: NO
6. HAVE YOU EVER DONE ANYTHING, STARTED TO DO ANYTHING, OR PREPARED TO DO ANYTHING TO END YOUR LIFE?: NO
2. HAVE YOU ACTUALLY HAD ANY THOUGHTS OF KILLING YOURSELF?: NO

## 2024-03-18 NOTE — PROGRESS NOTES
"   03/18/24 1207   Financial Resource Strain   How hard is it for you to pay for the very basics like food, housing, medical care, and heating? Very Hard   Housing Stability   In the last 12 months, was there a time when you were not able to pay the mortgage or rent on time? Y   In the last 12 months, how many places have you lived? 5   In the last 12 months, was there a time when you did not have a steady place to sleep or slept in a shelter (including now)? Y   Transportation Needs   In the past 12 months, has lack of transportation kept you from medical appointments or from getting medications? yes   In the past 12 months, has lack of transportation kept you from meetings, work, or from getting things needed for daily living? Yes   Food Insecurity   Within the past 12 months, you worried that your food would run out before you got the money to buy more. Often true   Within the past 12 months, the food you bought just didn't last and you didn't have money to get more. Often true     Sw met with pt bedside following a consult. Pt reports she is has been homeless living with friends and does not feel safe. Pt reports she she has not worked and uses \"crack\" daily in the amount of 2 grams. Pt reports she is having cravings currently and is interested in rehab for her drug use.   Sw connected [facility] to the pt to assist with inpatient placement for crack/cocaine use.   Vicky Alexander MSW LSW  "

## 2024-03-18 NOTE — ED PROVIDER NOTES
Emergency Department Encounter  [facility] EMERGENCY MEDICINE    Patient: Leigh Ann Joy  MRN: [id number]  : 1995  Date of Evaluation: 3/18/2024  ED Provider: Maynor Hilton PA-C    Chief Complaint       Chief Complaint   Patient presents with    Rash     [place]    (Location/Symptom, Timing/Onset, Context/Setting, Quality, Duration, Modifying Factors, Severity) Note limiting factors.     Leigh Ann Joy is a 28 y.o. female who presents to the emergency department for rash for 2 years.  Patient says she has had this rash for a a while now.  Patient says the rash is itchy and painful.  Patient said the rash is all throughout her body.  Denies taking any medications.  Patient is homeless.  Patient has been using crack/cocaine.  Denies SI/HI.  No hallucinations.    Limitations to History: none    Past History     Past Medical History:   Diagnosis Date    Supervision of pregnancy with grand multiparity, third trimester 2019    High risk multigravida in third trimester    Supervision of pregnancy with history of pre-term labor, unspecified trimester 2014    Previous  delivery, antepartum     Past Surgical History:   Procedure Laterality Date    OTHER SURGICAL HISTORY  2014    Reported Prior Surgical / Procedural History     Social History     Socioeconomic History    Marital status: Single     Spouse name: Not on file    Number of children: Not on file    Years of education: Not on file    Highest education level: Not on file   Occupational History    Not on file   Tobacco Use    Smoking status: Not on file    Smokeless tobacco: Not on file   Substance and Sexual Activity    Alcohol use: Not on file    Drug use: Not on file    Sexual activity: Not on file   Other Topics Concern    Not on file   Social History Narrative    Not on file     Social Determinants of Health     Financial Resource Strain: Not on file   Food Insecurity: Not on file   Transportation Needs: Not on file    Physical Activity: Not on file   Stress: Not on file   Social Connections: Not on file   Intimate Partner Violence: Not on file   Housing Stability: Not on file         Medications/Allergies     Previous Medications    ACETAMINOPHEN (TYLENOL) 325 MG TABLET    Take 1-2 tablets (325-650 mg) by mouth every 6 hours if needed.    DOCUSATE SODIUM (COLACE) 100 MG CAPSULE    Take 1 capsule (100 mg) by mouth 2 times a day as needed for constipation.    DOXYCYCLINE (MONODOX) 100 MG CAPSULE    Take 1 capsule (100 mg) by mouth 2 times a day. ( for 10 days)  (take with at least 8 oz of water of water, do not lie down for 30 minutes after taking)    GUAIFENESIN (HUMIBID 3) 400 MG TABLET    Take 1 tablet (400 mg) by mouth every 4 hours if needed.    IBUPROFEN (IBU) 800 MG TABLET    Take 1 tablet (800 mg) by mouth every 6 hours.    IBUPROFEN 600 MG TABLET    Take 1 tablet (600 mg) by mouth every 6 hours if needed.    METRONIDAZOLE (FLAGYL) 500 MG TABLET    Take 1 tablet (500 mg) by mouth 3 times a day.( for 7 days )    PRENATAL VIT,CALC76-IRON-FOLIC (PNV 29-1) 29 MG IRON- 1 MG TABLET    Take 1 tablet by mouth once daily.    PRENATAL VITAMIN CALCIUM-IRON-FOLIC (PRENATAL PLUS, CALCIUM CARB,) 27 MG IRON- 1 MG TABLET    Take 1 tablet by mouth once daily.     Allergies   Allergen Reactions    Penicillins Itching        Physical Exam       ED Triage Vitals [03/18/24 1037]   Temperature Heart Rate Respirations BP   36.3 °C (97.3 °F) 97 18 143/79      Pulse Ox Temp Source Heart Rate Source Patient Position   98 % Temporal Monitor --      BP Location FiO2 (%)     -- --       Physical Exam  GENERAL APPEARANCE: Awake and alert. Cooperative.   HEENT: Normocephalic. Atraumatic. Sclera anicteric. Tolerates saliva. No trismus.   NECK: Supple. Trachea midline.   CARDIO: Normal rate. Radial pulses symmetrical and palpable  LUNGS: Respirations unlabored. CTAB.  ABDOMEN: Soft. Non-distended. Non-tender throughout.  MUSCULOSKELETAL: No acute  deformities.   SKIN: Warm and dry.  Papule like rash all throughout body.  NEUROLOGICAL: No gross facial drooping. No obvious neurologic deficits.  strength symmetrical. Moves all 4 extremities spontaneously.       Diagnostics   Labs:  Labs Reviewed - No data to display  Radiographs:  No orders to display         EMERGENCY DEPARTMENT COURSE and DIFFERENTIAL DIAGNOSIS/MDM/PLAN:   Leigh Ann Joy is a 28 y.o. female who presented to the emergency department for rash for 2 years. Differential diagnosis included urticaria, hives, cellulitis, syphilis. Chart review shows patient had positive RPR on 10/30/2023.  Suspect rash is secondary to syphilis.  Will get syphilis screen.  Syphilis screen pending.  Patient will be given IM penicillin.  Patient does have documented allergy to penicillin. Patient received IM penicillin G on 10/31/2023 without any side effects.  Patient wanted help with crack/cocaine use.  Will consult  given patient is homeless.   met with patient and gave patient outpatient resources.  Patient given outpatient resources regarding crack/cocaine use as well.  Please see  note for additional details.    This patient presents with rash consistent with syphilis vs dermatitis.  Patient given dose of IM penicillin G benzathine here given concern for syphilis.  Patient is afebrile and nontoxic in appearance. History and exam findings not consistent with dangerous etiologies of rash such as SJS/TEN, or secondary dangerous causes such as petechial rashes from thrombocytopenia or rickettsial infections. Rash does not appear urticarial with no signs of anaphylaxis either.  Patient given PCP referral. Pt is to follow up with PCP in 2-3 days. Vital signs on discharge are stable. Leigh Ann Joy (or their surrogate) and I have discussed the diagnosis and risks, and we agree with discharging home with close follow-up. We also discussed returning to the Emergency Department  immediately if new or worsening symptoms occur. We have discussed the symptoms which are most concerning that necessitate immediate return.         Final Diagnosis:   1. Rash and other nonspecific skin eruption        Medications   doxycycline (Vibra-Tabs) tablet 100 mg (has no administration in time range)   penicillin G benzathine (Bicillin-LA) injection 2.4 Million Units (has no administration in time range)   predniSONE (Deltasone) tablet 50 mg (50 mg oral Given 3/18/24 1135)   diphenhydrAMINE (BENADryl) capsule 25 mg (25 mg oral Given 3/18/24 1135)       Diagnoses as of 03/18/24 1147   Rash and other nonspecific skin eruption       CONSULTS:  IP CONSULT TO SOCIAL WORK    PROCEDURES:  Unless otherwise noted below, none     Procedures    DISPOSITION/PLAN  Discharge 03/18/2024 11:27:53 AM    PATIENT REFERRED TO:  Doc Wilburn MD  [address] [address]  [phone]    In 2 days      [facility]  [address] [facility] [id number] [phone]    As needed, If symptoms worsen      DISCHARGE MEDICATIONS:  New Prescriptions    No medications on file     @[facility]([id number]:LAST:1)@    (Please note:  Portions of this note were completed with a voice recognition program.  Efforts were made to edit the dictations but occasionally words and phrases are mis-transcribed.)  Form [id number].[id number]         Maynor Hilton PA-C  03/18/24 1524

## 2024-03-18 NOTE — ED TRIAGE NOTES
Pt c/o rash/hives x2 years. Pt states seen 2 years ago and told it was cellulitis. Pt states she thinks it is something else.

## 2024-03-18 NOTE — DISCHARGE INSTRUCTIONS
Please follow up with your PCP in 2-3 days. Return to the emergency department if new or worsening symptoms develop.

## 2024-03-19 LAB — RPR SER QL: NONREACTIVE

## 2024-03-20 LAB — T PALLIDUM AB SER QL AGGL: REACTIVE

## 2024-04-06 ENCOUNTER — HOSPITAL ENCOUNTER (OUTPATIENT)
Facility: HOSPITAL | Age: 29
Setting detail: OBSERVATION
End: 2024-04-06
Attending: OBSTETRICS & GYNECOLOGY | Admitting: OBSTETRICS & GYNECOLOGY
Payer: MEDICAID

## 2024-04-06 ENCOUNTER — PHARMACY VISIT (OUTPATIENT)
Dept: PHARMACY | Facility: CLINIC | Age: 29
End: 2024-04-06
Payer: MEDICAID

## 2024-04-06 ENCOUNTER — APPOINTMENT (OUTPATIENT)
Dept: RADIOLOGY | Facility: HOSPITAL | Age: 29
End: 2024-04-06
Payer: MEDICAID

## 2024-04-06 ENCOUNTER — HOSPITAL ENCOUNTER (OUTPATIENT)
Facility: HOSPITAL | Age: 29
Setting detail: OBSERVATION
Discharge: HOME | End: 2024-04-06
Attending: EMERGENCY MEDICINE | Admitting: OBSTETRICS & GYNECOLOGY
Payer: MEDICAID

## 2024-04-06 VITALS
WEIGHT: 232.81 LBS | OXYGEN SATURATION: 99 % | DIASTOLIC BLOOD PRESSURE: 55 MMHG | BODY MASS INDEX: 41.25 KG/M2 | TEMPERATURE: 97.5 F | HEIGHT: 63 IN | RESPIRATION RATE: 19 BRPM | HEART RATE: 85 BPM | SYSTOLIC BLOOD PRESSURE: 110 MMHG

## 2024-04-06 DIAGNOSIS — Z86.19 HISTORY OF SYPHILIS: ICD-10-CM

## 2024-04-06 DIAGNOSIS — Z3A.18 18 WEEKS GESTATION OF PREGNANCY (HHS-HCC): Primary | ICD-10-CM

## 2024-04-06 DIAGNOSIS — O03.88 ABORTION WITH URINARY TRACT INFECTION (HHS-HCC): ICD-10-CM

## 2024-04-06 DIAGNOSIS — O09.30 LATE PRENATAL CARE (HHS-HCC): ICD-10-CM

## 2024-04-06 DIAGNOSIS — A59.01 TRICHOMONAS VAGINALIS (TV) INFECTION: ICD-10-CM

## 2024-04-06 DIAGNOSIS — A59.9 TRICHOMONAS INFECTION: ICD-10-CM

## 2024-04-06 DIAGNOSIS — O23.42 URINARY TRACT INFECTION IN MOTHER DURING SECOND TRIMESTER OF PREGNANCY (HHS-HCC): ICD-10-CM

## 2024-04-06 DIAGNOSIS — O26.892 PELVIC PAIN AFFECTING PREGNANCY IN SECOND TRIMESTER, ANTEPARTUM (HHS-HCC): ICD-10-CM

## 2024-04-06 DIAGNOSIS — R10.2 PELVIC PAIN AFFECTING PREGNANCY IN SECOND TRIMESTER, ANTEPARTUM (HHS-HCC): ICD-10-CM

## 2024-04-06 LAB
ABO GROUP (TYPE) IN BLOOD: NORMAL
AMPHETAMINES UR QL SCN: NORMAL
ANTIBODY SCREEN: NORMAL
APPEARANCE UR: CLEAR
B-HCG SERPL-ACNC: ABNORMAL MIU/ML
BARBITURATES UR QL SCN: NORMAL
BASOPHILS # BLD AUTO: 0.03 X10*3/UL (ref 0–0.1)
BASOPHILS NFR BLD AUTO: 0.3 %
BENZODIAZ UR QL SCN: NORMAL
BILIRUB UR STRIP.AUTO-MCNC: NEGATIVE MG/DL
BILIRUBIN, POC: NEGATIVE
BLOOD URINE, POC: NEGATIVE
BZE UR QL SCN: NORMAL
C TRACH RRNA SPEC QL NAA+PROBE: NEGATIVE
CANNABINOIDS UR QL SCN: NORMAL
CLARITY, POC: CLEAR
CLUE CELLS SPEC QL WET PREP: ABNORMAL
COLOR UR: ABNORMAL
COLOR, POC: YELLOW
EOSINOPHIL # BLD AUTO: 0.05 X10*3/UL (ref 0–0.7)
EOSINOPHIL NFR BLD AUTO: 0.5 %
ERYTHROCYTE [DISTWIDTH] IN BLOOD BY AUTOMATED COUNT: 12.6 % (ref 11.5–14.5)
ERYTHROCYTE [DISTWIDTH] IN BLOOD BY AUTOMATED COUNT: 12.6 % (ref 11.5–14.5)
FENTANYL+NORFENTANYL UR QL SCN: NORMAL
GLUCOSE UR STRIP.AUTO-MCNC: NORMAL MG/DL
GLUCOSE URINE, POC: NEGATIVE
HBV SURFACE AG SERPL QL IA: NONREACTIVE
HCT VFR BLD AUTO: 37.3 % (ref 36–46)
HCT VFR BLD AUTO: 37.9 % (ref 36–46)
HCV AB SER QL: NONREACTIVE
HGB BLD-MCNC: 12.3 G/DL (ref 12–16)
HGB BLD-MCNC: 13.1 G/DL (ref 12–16)
HIV 1+2 AB+HIV1 P24 AG SERPL QL IA: NONREACTIVE
HOLD SPECIMEN: NORMAL
IMM GRANULOCYTES # BLD AUTO: 0.09 X10*3/UL (ref 0–0.7)
IMM GRANULOCYTES NFR BLD AUTO: 0.9 % (ref 0–0.9)
KETONES UR STRIP.AUTO-MCNC: NEGATIVE MG/DL
KETONES, POC: NEGATIVE
LEUKOCYTE EST, POC: NORMAL
LEUKOCYTE ESTERASE UR QL STRIP.AUTO: ABNORMAL
LYMPHOCYTES # BLD AUTO: 2.77 X10*3/UL (ref 1.2–4.8)
LYMPHOCYTES NFR BLD AUTO: 27.3 %
MCH RBC QN AUTO: 30.8 PG (ref 26–34)
MCH RBC QN AUTO: 31.1 PG (ref 26–34)
MCHC RBC AUTO-ENTMCNC: 33 G/DL (ref 32–36)
MCHC RBC AUTO-ENTMCNC: 34.6 G/DL (ref 32–36)
MCV RBC AUTO: 89 FL (ref 80–100)
MCV RBC AUTO: 94 FL (ref 80–100)
METHADONE UR QL SCN: NORMAL
MONOCYTES # BLD AUTO: 0.52 X10*3/UL (ref 0.1–1)
MONOCYTES NFR BLD AUTO: 5.1 %
MUCOUS THREADS #/AREA URNS AUTO: ABNORMAL /LPF
N GONORRHOEA DNA SPEC QL PROBE+SIG AMP: NEGATIVE
NEUTROPHILS # BLD AUTO: 6.68 X10*3/UL (ref 1.2–7.7)
NEUTROPHILS NFR BLD AUTO: 65.9 %
NITRITE UR QL STRIP.AUTO: NEGATIVE
NITRITE, POC: NEGATIVE
NRBC BLD-RTO: 0 /100 WBCS (ref 0–0)
NRBC BLD-RTO: 0 /100 WBCS (ref 0–0)
OPIATES UR QL SCN: NORMAL
OXYCODONE+OXYMORPHONE UR QL SCN: NORMAL
PCP UR QL SCN: NORMAL
PH UR STRIP.AUTO: 6.5 [PH]
PH, POC: 6.5
PLATELET # BLD AUTO: 235 X10*3/UL (ref 150–450)
PLATELET # BLD AUTO: 259 X10*3/UL (ref 150–450)
PREGNANCY TEST URINE, POC: POSITIVE
PROT UR STRIP.AUTO-MCNC: NEGATIVE MG/DL
RBC # BLD AUTO: 3.95 X10*6/UL (ref 4–5.2)
RBC # BLD AUTO: 4.25 X10*6/UL (ref 4–5.2)
RBC # UR STRIP.AUTO: NEGATIVE /UL
RBC #/AREA URNS AUTO: ABNORMAL /HPF
REFLEX ADDED, ANEMIA PANEL: NORMAL
RH FACTOR (ANTIGEN D): NORMAL
RUBV IGG SERPL IA-ACNC: 1 IA
RUBV IGG SERPL QL IA: POSITIVE
SP GR UR STRIP.AUTO: 1.02
SPECIFIC GRAVITY, POC: 1.02
SQUAMOUS #/AREA URNS AUTO: ABNORMAL /HPF
T VAGINALIS SPEC QL WET PREP: PRESENT
TREPONEMA PALLIDUM IGG+IGM AB [PRESENCE] IN SERUM OR PLASMA BY IMMUNOASSAY: REACTIVE
URINE PROTEIN, POC: NEGATIVE
UROBILINOGEN UR STRIP.AUTO-MCNC: NORMAL MG/DL
UROBILINOGEN, POC: 0.2
WBC # BLD AUTO: 10.1 X10*3/UL (ref 4.4–11.3)
WBC # BLD AUTO: 9 X10*3/UL (ref 4.4–11.3)
WBC #/AREA URNS AUTO: ABNORMAL /HPF
WBC VAG QL WET PREP: ABNORMAL
YEAST VAG QL WET PREP: ABNORMAL

## 2024-04-06 PROCEDURE — 99214 OFFICE O/P EST MOD 30 MIN: CPT

## 2024-04-06 PROCEDURE — 86317 IMMUNOASSAY INFECTIOUS AGENT: CPT

## 2024-04-06 PROCEDURE — 87340 HEPATITIS B SURFACE AG IA: CPT

## 2024-04-06 PROCEDURE — 86318 IA INFECTIOUS AGENT ANTIBODY: CPT | Mod: 59

## 2024-04-06 PROCEDURE — G0378 HOSPITAL OBSERVATION PER HR: HCPCS

## 2024-04-06 PROCEDURE — 36415 COLL VENOUS BLD VENIPUNCTURE: CPT

## 2024-04-06 PROCEDURE — 80307 DRUG TEST PRSMV CHEM ANLYZR: CPT

## 2024-04-06 PROCEDURE — 99213 OFFICE O/P EST LOW 20 MIN: CPT | Mod: 25,27

## 2024-04-06 PROCEDURE — 84702 CHORIONIC GONADOTROPIN TEST: CPT

## 2024-04-06 PROCEDURE — 87389 HIV-1 AG W/HIV-1&-2 AB AG IA: CPT

## 2024-04-06 PROCEDURE — 87086 URINE CULTURE/COLONY COUNT: CPT

## 2024-04-06 PROCEDURE — 86780 TREPONEMA PALLIDUM: CPT

## 2024-04-06 PROCEDURE — 76810 OB US >/= 14 WKS ADDL FETUS: CPT

## 2024-04-06 PROCEDURE — 81001 URINALYSIS AUTO W/SCOPE: CPT | Mod: CCI

## 2024-04-06 PROCEDURE — 81002 URINALYSIS NONAUTO W/O SCOPE: CPT | Mod: CCI

## 2024-04-06 PROCEDURE — RXMED WILLOW AMBULATORY MEDICATION CHARGE

## 2024-04-06 PROCEDURE — 85025 COMPLETE CBC W/AUTO DIFF WBC: CPT

## 2024-04-06 PROCEDURE — 83021 HEMOGLOBIN CHROMOTOGRAPHY: CPT

## 2024-04-06 PROCEDURE — 87800 DETECT AGNT MULT DNA DIREC: CPT

## 2024-04-06 PROCEDURE — 83020 HEMOGLOBIN ELECTROPHORESIS: CPT | Performed by: PATHOLOGY

## 2024-04-06 PROCEDURE — 86901 BLOOD TYPING SEROLOGIC RH(D): CPT

## 2024-04-06 PROCEDURE — 76815 OB US LIMITED FETUS(S): CPT

## 2024-04-06 PROCEDURE — 86803 HEPATITIS C AB TEST: CPT

## 2024-04-06 PROCEDURE — 99285 EMERGENCY DEPT VISIT HI MDM: CPT

## 2024-04-06 PROCEDURE — 87210 SMEAR WET MOUNT SALINE/INK: CPT

## 2024-04-06 PROCEDURE — 99285 EMERGENCY DEPT VISIT HI MDM: CPT | Mod: 25

## 2024-04-06 PROCEDURE — 85027 COMPLETE CBC AUTOMATED: CPT | Mod: 59

## 2024-04-06 PROCEDURE — 81025 URINE PREGNANCY TEST: CPT

## 2024-04-06 RX ORDER — ONDANSETRON 4 MG/1
4 TABLET, FILM COATED ORAL EVERY 6 HOURS PRN
Status: DISCONTINUED | OUTPATIENT
Start: 2024-04-06 | End: 2024-04-06 | Stop reason: HOSPADM

## 2024-04-06 RX ORDER — METRONIDAZOLE 500 MG/1
500 TABLET ORAL 2 TIMES DAILY
Qty: 14 TABLET | Refills: 0 | Status: SHIPPED | OUTPATIENT
Start: 2024-04-06 | End: 2024-04-13

## 2024-04-06 RX ORDER — ONDANSETRON 4 MG/1
4 TABLET, FILM COATED ORAL EVERY 12 HOURS PRN
Qty: 14 TABLET | Refills: 0 | Status: SHIPPED | OUTPATIENT
Start: 2024-04-06

## 2024-04-06 RX ORDER — SULFAMETHOXAZOLE AND TRIMETHOPRIM 800; 160 MG/1; MG/1
1 TABLET ORAL 2 TIMES DAILY
Qty: 14 TABLET | Refills: 0 | Status: SHIPPED | OUTPATIENT
Start: 2024-04-06 | End: 2024-04-13

## 2024-04-06 RX ORDER — DIPHENHYDRAMINE HCL 25 MG
25 TABLET ORAL NIGHTLY PRN
Qty: 30 TABLET | Refills: 2 | Status: SHIPPED | OUTPATIENT
Start: 2024-04-06

## 2024-04-06 RX ORDER — ONDANSETRON HYDROCHLORIDE 2 MG/ML
4 INJECTION, SOLUTION INTRAVENOUS EVERY 6 HOURS PRN
Status: DISCONTINUED | OUTPATIENT
Start: 2024-04-06 | End: 2024-04-06 | Stop reason: HOSPADM

## 2024-04-06 ASSESSMENT — PAIN SCALES - GENERAL: PAINLEVEL_OUTOF10: 8

## 2024-04-06 NOTE — ED PROVIDER NOTES
HPI   Chief Complaint   Patient presents with   • UTI       This patient is a 28-year-old female with past medical history of crack cocaine use currently in rehab as well as homelessness presenting today for 2 weeks of dysuria.  She reports that [facility] in [facility] for her UTI but went to wrong pharmacy so she never picked it up.  Reports that she is pregnant, approximately 9 weeks gestation and LMP 2/4.  Is endorsing over the past few days, suprapubic abdominal pain, but no fevers.  Endorsing bilateral lower back pain but no flank pain.  Has not received ultrasound to confirm IUP.  Reports no vaginal discharge or odor though wants to be tested for STIs.  Denies nausea vomiting or diarrhea.                        No data recorded                   Patient History   Past Medical History:   Diagnosis Date   • Supervision of pregnancy with grand multiparity, third trimester 2019    High risk multigravida in third trimester   • Supervision of pregnancy with history of pre-term labor, unspecified trimester 2014    Previous  delivery, antepartum     Past Surgical History:   Procedure Laterality Date   • OTHER SURGICAL HISTORY  2014    Reported Prior Surgical / Procedural History     Family History   Problem Relation Name Age of Onset   • Epilepsy Father       Social History     Tobacco Use   • Smoking status: Not on file   • Smokeless tobacco: Not on file   Substance Use Topics   • Alcohol use: Not on file   • Drug use: Not on file       Physical Exam   ED Triage Vitals   Temp Pulse Resp BP   -- -- -- --      SpO2 Temp src Heart Rate Source Patient Position   -- -- -- --      BP Location FiO2 (%)     -- --       Physical Exam  Vitals and nursing note reviewed. Exam conducted with a chaperone present.   Constitutional:       General: She is not in acute distress.     Appearance: Normal appearance. She is not ill-appearing.   HENT:      Head: Normocephalic and atraumatic.      Right Ear:  External ear normal.      Left Ear: External ear normal.      Nose: Nose normal. No congestion or rhinorrhea.      Mouth/Throat:      Mouth: Mucous membranes are moist.      Pharynx: Oropharynx is clear. No oropharyngeal exudate or posterior oropharyngeal erythema.   Eyes:      Extraocular Movements: Extraocular movements intact.      Conjunctiva/sclera: Conjunctivae normal.      Pupils: Pupils are equal, round, and reactive to light.   Cardiovascular:      Rate and Rhythm: Normal rate and regular rhythm.      Heart sounds: Normal heart sounds.   Pulmonary:      Effort: No accessory muscle usage or respiratory distress.      Breath sounds: Normal breath sounds. No wheezing, rhonchi or rales.   Abdominal:      General: Abdomen is flat. Bowel sounds are normal. There is no distension.      Palpations: Abdomen is soft.      Tenderness: There is abdominal tenderness (Suprapubic discomfort on palpation). There is no right CVA tenderness or left CVA tenderness.   Genitourinary:     Labia:         Right: No rash or lesion.         Left: No rash or lesion.       Vagina: Vaginal discharge present. No erythema, tenderness, bleeding or lesions.      Cervix: No cervical motion tenderness, discharge or cervical bleeding.      Uterus: Normal.       Adnexa:         Right: No mass or tenderness.          Left: Tenderness present. No mass.        Rectum: Normal.   Musculoskeletal:         General: No swelling or deformity. Normal range of motion.      Cervical back: Normal range of motion and neck supple.      Right lower leg: No edema.      Left lower leg: No edema.   Skin:     General: Skin is warm and dry.      Capillary Refill: Capillary refill takes less than 2 seconds.   Neurological:      General: No focal deficit present.      Mental Status: She is alert and oriented to person, place, and time.      GCS: GCS eye subscore is 4. GCS verbal subscore is 5. GCS motor subscore is 6.      Cranial Nerves: Cranial nerves 2-12 are  intact.      Sensory: No sensory deficit.      Motor: Motor function is intact. No weakness.   Psychiatric:         Mood and Affect: Mood and affect normal.         Speech: Speech normal.         Behavior: Behavior normal. Behavior is cooperative.       ED Course & MDM   ED Course as of 04/06/24 1130   Sat Apr 06, 2024   0951 Preg Test, Ur(!): Positive [ML]   1059 Trichomonas(!): Present [ML]   1115 US OB 14+ weeks each additional gestation  18 weeks, 4 days.  Considering how far along she is in pregnancy, will send off to L&D for further evaluation of her pelvic/abdominal pain. [ML]   1129 CBC and Auto Differential  No leukocytosis or acute anemia [ML]   1129 Urinalysis with Reflex Culture and Microscopic(!)  Trace leukocyte Estrace, no blood in urine. [ML]   1130 HCG, Beta-Quantitative(!): 16,632 [ML]   1130 Wet Prep with Trichomonas Reflex If Neg(!)  Trichomonas present on wet prep but no clue cells or yeast.   [ML]      ED Course User Index  [ML] Taylor Chapman PA-C         Diagnoses as of 04/06/24 1130   18 weeks gestation of pregnancy   Pelvic pain affecting pregnancy in second trimester, antepartum   Trichomonas vaginalis (TV) infection       Medical Decision Making  28-year-old female presenting today for pelvic pain.  Reports that the pain is suprapubic.  Trace discomfort noted on palpation.  No CVA tenderness though endorsing bilateral lower lumbar back pain.  No midline L-spine pain.  No fever, no tachycardia, no signs of SIRS.  Reports concerns of UTI.  Supposedly was sent in antibiotics for UTI but never picked them up.  Having symptoms for 2 weeks including dysuria.  Has low suspicion for STI though requested swabs.  Reports last menstrual period 2/4 approximately 9 weeks gestation.  Of note, patient residing at rehab facility and 3 weeks clean from crack cocaine.  Pelvic exam was performed with RN as chaperone.  No obvious bleeding.  No CMT.  There was notable vaginal discharge.  Having trace  left-sided adnexal pain but no mass bilaterally.  Wet prep and gonorrhea/chlamydia swabs were sent.  Getting basic lab work considering her abdominal pain to rule out acute abdomen though will defer any CT imaging considering her pregnancy.  Getting a transvaginal ultrasound to rule out ectopic considering she has no confirmed IUP at this time.    CBC showing no leukocytosis or acute anemia.  CMP and lipase pending at this time.  Urinalysis shows trace leukocyte Estrace but no blood in urine.  Wet prep did come back positive for trichomonas.  Transvaginal ultrasound shows intrauterine pregnancy at approximately 18 weeks 4 days.  Considering how far along she is in her pregnancy, will defer any other evaluation for her pelvic/abdominal pain and send her up to L&D for further evaluation.  Will defer trichomonas/STI/UTI treatment to L&D team.        Procedure  Procedures     Taylor Chapman PA-C  04/06/24 1131       Taylor Chapman PA-C  04/06/24 1132

## 2024-04-06 NOTE — ED TRIAGE NOTES
Pt came to the ED via ambulance for UTI. Pt called 911 with complaints  with lower abd pain and pain with urination.

## 2024-04-06 NOTE — H&P
"Obstetrical Triage History and Physical     Leigh Annmiryam Joy is a 28 y.o.  at 18.4wks by US ()    Chief Complaint: UTI    Assessment/Plan    Suspect UTI  - Dysuria and pressure x2wks  - Udip neg for nitrites, (+) leuks  - Urine sent for culture  - Afebrile  - Given symptoms, will treat with bactrim x 7 days  - Return precautions given    No Prenatal Care  - Dating per ED US by femur length ~ 18.4wks  - PNL collected  - Hx of syphilis, treated with PCN  - PNV prescribed  - Tasked [facility] to assist with prenatal care and anatomy scan scheduling    Hx of Substance Use  - Sober x1 mo  - Living at [place]  - Agreed to drug screen today  - Referrals to [facility] offered    Trichomonas  - Metronidazole sent    IUP at 18.4wks  - FHT: 135  - Precautions to return discussed     Maternal Well-being  - Vital signs stable and WNL  - All questions and concerns addressed     Active Problems:  There are no active Hospital Problems.      Pregnancy Problems (from 24 to present)       No problems associated with this episode.          Subjective   Leigh Ann is here for urinary symptoms and to establish prenatal care.  States she has had dysuria and pressure with urination x2 weeks.  Denies fever, chills, or flank pain.  Denies VB, LOF, and reports feeling some movement.    Of note, patient has a history of substance abuse.  States she has been sober x1mo.  Denies recent use.  Living at [facility] facility.  Excited about the support and being on \"the right track\".        Obstetrical History   OB History    Para Term  AB Living   8 4 4 0 3 4   SAB IAB Ectopic Multiple Live Births           4      # Outcome Date GA Lbr Carlos/2nd Weight Sex Delivery Anes PTL Lv   8 Current            7 AB            6 AB            5 AB            4 Term            3 Term            2 Term            1 Term                Past Medical History  Past Medical History:   Diagnosis Date    Supervision of pregnancy with grand " "multiparity, third trimester 2019    High risk multigravida in third trimester    Supervision of pregnancy with history of pre-term labor, unspecified trimester 2014    Previous  delivery, antepartum        Past Surgical History   Past Surgical History:   Procedure Laterality Date    OTHER SURGICAL HISTORY  2014    Reported Prior Surgical / Procedural History       Social History  Social History     Tobacco Use    Smoking status: Every Day     Packs/day: .25     Types: Cigarettes    Smokeless tobacco: Never   Substance Use Topics    Alcohol use: Never     Substance and Sexual Activity   Drug Use Yes    Types: \"Crack\" cocaine, Marijuana    Comment: 1 month sober       Allergies  Penicillins     Medications  Medications Prior to Admission   Medication Sig Dispense Refill Last Dose    doxycycline (Monodox) 100 mg capsule Take 1 capsule (100 mg) by mouth 2 times a day. ( for 10 days)  (take with at least 8 oz of water of water, do not lie down for 30 minutes after taking) 20 capsule 0     metroNIDAZOLE (Flagyl) 500 mg tablet Take 1 tablet (500 mg) by mouth 3 times a day.( for 7 days ) 21 tablet 0     prenatal vitamin calcium-iron-folic (Prenatal Plus, calcium carb,) 27 mg iron- 1 mg tablet Take 1 tablet by mouth once daily.          Objective    Last Vitals  Temp Pulse Resp BP MAP O2 Sat   36.4 °C (97.5 °F) 85 19 110/55   99 %     Physical Examination  Physical Exam  Exam conducted with a chaperone present.   Constitutional:       Appearance: Normal appearance. She is obese.   HENT:      Head: Normocephalic.   Cardiovascular:      Rate and Rhythm: Normal rate.   Pulmonary:      Effort: Pulmonary effort is normal.   Abdominal:      Comments: Gravid   Genitourinary:     Comments: FHT: 135  Musculoskeletal:         General: Normal range of motion.   Skin:     General: Skin is warm.   Neurological:      Mental Status: She is alert and oriented to person, place, and time.   Psychiatric:         Mood " and Affect: Mood normal.         Behavior: Behavior normal.        Lab Review  Labs in chart were reviewed.

## 2024-04-07 LAB — RPR SER QL: NONREACTIVE

## 2024-04-08 ENCOUNTER — PATIENT OUTREACH (OUTPATIENT)
Dept: CARE COORDINATION | Facility: CLINIC | Age: 29
End: 2024-04-08
Payer: MEDICAID

## 2024-04-08 LAB
BACTERIA UR CULT: NORMAL
HEMOGLOBIN A2: 3 % (ref 2–3.5)
HEMOGLOBIN A: 96.5 % (ref 95.8–98)
HEMOGLOBIN F: 0.5 % (ref 0–2)
HEMOGLOBIN IDENTIFICATION INTERPRETATION: NORMAL
PATH REVIEW-HGB IDENTIFICATION: NORMAL
T PALLIDUM AB SER QL AGGL: REACTIVE

## 2024-04-08 NOTE — PROGRESS NOTES
Discharge facility: [facility]  Discharge diagnosis: UTI  Admission date: 4/6/24  Discharge date: 4/6/42  Follow Up Appointment Date: Needs scheduled     Outreach call to patient to support a smooth transition of care from recent admission.  Left voicemail message for patient with my contact information.     Aracely Russell RN

## 2024-04-09 DIAGNOSIS — Z86.19 HISTORY OF SYPHILIS: Primary | ICD-10-CM

## 2024-04-09 LAB — RPR SER QL: NONREACTIVE

## 2024-04-11 ENCOUNTER — HOSPITAL ENCOUNTER (OUTPATIENT)
Dept: RADIOLOGY | Facility: HOSPITAL | Age: 29
Discharge: HOME | End: 2024-04-11
Payer: MEDICAID

## 2024-04-11 ENCOUNTER — INITIAL PRENATAL (OUTPATIENT)
Dept: OBSTETRICS AND GYNECOLOGY | Facility: HOSPITAL | Age: 29
End: 2024-04-11
Payer: MEDICAID

## 2024-04-11 ENCOUNTER — TELEPHONE (OUTPATIENT)
Dept: PEDIATRICS | Facility: CLINIC | Age: 29
End: 2024-04-11
Payer: MEDICAID

## 2024-04-11 ENCOUNTER — DOCUMENTATION (OUTPATIENT)
Dept: OBSTETRICS AND GYNECOLOGY | Facility: HOSPITAL | Age: 29
End: 2024-04-11
Payer: MEDICAID

## 2024-04-11 ENCOUNTER — PHARMACY VISIT (OUTPATIENT)
Dept: PHARMACY | Facility: CLINIC | Age: 29
End: 2024-04-11
Payer: MEDICAID

## 2024-04-11 VITALS — SYSTOLIC BLOOD PRESSURE: 104 MMHG | WEIGHT: 231 LBS | DIASTOLIC BLOOD PRESSURE: 58 MMHG | BODY MASS INDEX: 40.92 KG/M2

## 2024-04-11 DIAGNOSIS — Z32.01 PREGNANCY TEST POSITIVE (HHS-HCC): Primary | ICD-10-CM

## 2024-04-11 DIAGNOSIS — Z12.4 CERVICAL CANCER SCREENING: ICD-10-CM

## 2024-04-11 DIAGNOSIS — N89.8 VAGINAL ITCHING: ICD-10-CM

## 2024-04-11 DIAGNOSIS — F19.10 SUBSTANCE ABUSE (MULTI): ICD-10-CM

## 2024-04-11 DIAGNOSIS — Z3A.19 19 WEEKS GESTATION OF PREGNANCY (HHS-HCC): ICD-10-CM

## 2024-04-11 DIAGNOSIS — O09.92 HIGH-RISK PREGNANCY IN SECOND TRIMESTER (HHS-HCC): ICD-10-CM

## 2024-04-11 DIAGNOSIS — Z59.41 FOOD INSECURITY: ICD-10-CM

## 2024-04-11 LAB
CLUE CELLS VAG LPF-#/AREA: NORMAL /[LPF]
NUGENT SCORE: 1
YEAST VAG WET PREP-#/AREA: NORMAL

## 2024-04-11 PROCEDURE — 87205 SMEAR GRAM STAIN: CPT | Performed by: ADVANCED PRACTICE MIDWIFE

## 2024-04-11 PROCEDURE — RXMED WILLOW AMBULATORY MEDICATION CHARGE

## 2024-04-11 PROCEDURE — 76811 OB US DETAILED SNGL FETUS: CPT

## 2024-04-11 PROCEDURE — 76811 OB US DETAILED SNGL FETUS: CPT | Performed by: OBSTETRICS & GYNECOLOGY

## 2024-04-11 PROCEDURE — 88175 CYTOPATH C/V AUTO FLUID REDO: CPT | Mod: TC,GCY | Performed by: ADVANCED PRACTICE MIDWIFE

## 2024-04-11 PROCEDURE — 87624 HPV HI-RISK TYP POOLED RSLT: CPT | Performed by: ADVANCED PRACTICE MIDWIFE

## 2024-04-11 PROCEDURE — 99215 OFFICE O/P EST HI 40 MIN: CPT | Performed by: ADVANCED PRACTICE MIDWIFE

## 2024-04-11 RX ORDER — TERCONAZOLE 4 MG/G
1 CREAM VAGINAL NIGHTLY
Qty: 45 G | Refills: 0 | Status: SHIPPED | OUTPATIENT
Start: 2024-04-11 | End: 2024-05-11

## 2024-04-11 RX ORDER — ACETAMINOPHEN 325 MG/1
650 TABLET ORAL EVERY 6 HOURS PRN
Qty: 30 TABLET | Refills: 0 | Status: SHIPPED | OUTPATIENT
Start: 2024-04-11 | End: 2024-04-21

## 2024-04-11 SDOH — ECONOMIC STABILITY - FOOD INSECURITY: FOOD INSECURITY: Z59.41

## 2024-04-11 SDOH — ECONOMIC STABILITY: FOOD INSECURITY: WITHIN THE PAST 12 MONTHS, YOU WORRIED THAT YOUR FOOD WOULD RUN OUT BEFORE YOU GOT MONEY TO BUY MORE.: NEVER TRUE

## 2024-04-11 SDOH — ECONOMIC STABILITY: FOOD INSECURITY: WITHIN THE PAST 12 MONTHS, THE FOOD YOU BOUGHT JUST DIDN'T LAST AND YOU DIDN'T HAVE MONEY TO GET MORE.: NEVER TRUE

## 2024-04-11 ASSESSMENT — EDINBURGH POSTNATAL DEPRESSION SCALE (EPDS)
TOTAL SCORE: 9
I HAVE LOOKED FORWARD WITH ENJOYMENT TO THINGS: AS MUCH AS I EVER DID
I HAVE FELT SCARED OR PANICKY FOR NO GOOD REASON: NO, NOT AT ALL
I HAVE BEEN SO UNHAPPY THAT I HAVE HAD DIFFICULTY SLEEPING: YES, SOMETIMES
THINGS HAVE BEEN GETTING ON TOP OF ME: YES, MOST OF THE TIME I HAVEN'T BEEN ABLE TO COPE AT ALL
THE THOUGHT OF HARMING MYSELF HAS OCCURRED TO ME: NEVER
I HAVE FELT SAD OR MISERABLE: NOT VERY OFTEN
I HAVE BEEN SO UNHAPPY THAT I HAVE BEEN CRYING: ONLY OCCASIONALLY
I HAVE BEEN ANXIOUS OR WORRIED FOR NO GOOD REASON: NO, NOT AT ALL
I HAVE BLAMED MYSELF UNNECESSARILY WHEN THINGS WENT WRONG: YES, SOME OF THE TIME
I HAVE BEEN ABLE TO LAUGH AND SEE THE FUNNY SIDE OF THINGS: AS MUCH AS I ALWAYS COULD

## 2024-04-11 ASSESSMENT — PATIENT HEALTH QUESTIONNAIRE - PHQ9
10. IF YOU CHECKED OFF ANY PROBLEMS, HOW DIFFICULT HAVE THESE PROBLEMS MADE IT FOR YOU TO DO YOUR WORK, TAKE CARE OF THINGS AT HOME, OR GET ALONG WITH OTHER PEOPLE: NOT DIFFICULT AT ALL
1. LITTLE INTEREST OR PLEASURE IN DOING THINGS: SEVERAL DAYS
SUM OF ALL RESPONSES TO PHQ9 QUESTIONS 1 & 2: 2
2. FEELING DOWN, DEPRESSED OR HOPELESS: SEVERAL DAYS

## 2024-04-11 ASSESSMENT — ENCOUNTER SYMPTOMS
ALLERGIC/IMMUNOLOGIC NEGATIVE: 0
EYES NEGATIVE: 0
OCCASIONAL FEELINGS OF UNSTEADINESS: 0
CONSTITUTIONAL NEGATIVE: 0
CARDIOVASCULAR NEGATIVE: 0
DEPRESSION: 0
PSYCHIATRIC NEGATIVE: 0
GASTROINTESTINAL NEGATIVE: 0
ENDOCRINE NEGATIVE: 0
NEUROLOGICAL NEGATIVE: 0
RESPIRATORY NEGATIVE: 0
MUSCULOSKELETAL NEGATIVE: 0
LOSS OF SENSATION IN FEET: 0
HEMATOLOGIC/LYMPHATIC NEGATIVE: 0

## 2024-04-11 NOTE — PROGRESS NOTES
Social Work Assessment     Patient: Leigh Ann Joy, 29yo, , SRI 9/3/24  Address: [address] [place] [address]*  Phone: [phone]*  *Ms Joy is homeless, currently resides at [facility] at above address. She does not have a phone, number listed is for [facility]    Referral Reason: homeless, substance use disorder, transportation, resources    Prenatal Care: started today, about 20 weeks pregnant per medical team  Barriers: transportation - Per Ms Joy, [facility] can provide a Lyft to appt but not home. FRANCI let her know we were available to assist with home transport, Lyft today and also bus tickets as needed/appropriate.     Other Children: 4 other children, oldest 2 with their father, younger 2 in foster care    FOB:Ms Joy reports she does not know who the FOB is. She denies safety concerns/force/coercion    Household Composition: at [facility] for inpatient substance use treatment    Supports: Ms Joy reports her primary support is [facility]  Missy    IPV/DV or Safety Concerns: denies    School/Work/Income: Ms Joy reports she is not receiving food stamps at this time and is unsure if she has a medical card. She states she started application for both about 1 month ago when she was at [facility], unsure if benefits mailed there but will call. FRANCI provided contact number. If [employer] does not have card, Ms Joy to reapply with help from Missy. FRANCI also provided referral to [facility] [employer] and referral to "[facility].     Insurance: [facility]    Substance Use History: history of cocaine and mj use, reports she has been clean about 1 month and is not in treatment at [facility].    Mental Health Diagnoses/Concerns: Ms Joy denies symptoms, she states she has access to counseling at [facility].     Toxicology Screens: urine screen negative 24    Department of Children and Family Services (DCFS): Ms Joy with a history of DCFS involvement with no custody of older  children. SW reviewed need for involvement at delivery due to this, reviewed steps Ms Joy could take to limit/reduce involvement and encouraged her to reach out for SW support as needed.     Plan: SW will remain available as needed throughout pregnancy and will see at delivery. Please message as needs noted.     Signature: SELINA Woods

## 2024-04-11 NOTE — TELEPHONE ENCOUNTER
SW received a referral from JD Evans-RANDALL, obtained additional information and spoke with patient. SW discussed role, provided information about [facility] and addressed questions. Patient indicated that she is interested in being scheduled in [facility] Moms clinic and explained that she is currently residing at [facility] and she do not have a working phone at this time. Patient stated that she is willing to sign a KATARZYNA so that the [facility] team can correspond with staff at [facility]. FRANCI scheduled patient (4/16/24 at 9:30 am) and explained that FRANCI will set up transportation for 9am and [facility] team will contact staff at [facility] with a reminder the day before patient's appointment. SW provided patient with contact information and encouraged patient to contact FRANCI/LAURIE if she have additional questions or needs before her scheduled appointment.

## 2024-04-12 PROBLEM — O91.112: Status: RESOLVED | Noted: 2023-11-03 | Resolved: 2024-04-12

## 2024-04-12 PROBLEM — M54.9 BACK PAIN AFFECTING PREGNANCY IN SECOND TRIMESTER (HHS-HCC): Status: RESOLVED | Noted: 2023-11-03 | Resolved: 2024-04-12

## 2024-04-12 PROBLEM — Z32.01 PREGNANCY TEST POSITIVE (HHS-HCC): Status: ACTIVE | Noted: 2024-04-12

## 2024-04-12 PROBLEM — Z65.3 LOST CUSTODY OF CHILDREN: Status: ACTIVE | Noted: 2024-04-12

## 2024-04-12 PROBLEM — N89.8 VAGINAL ITCHING: Status: ACTIVE | Noted: 2024-04-12

## 2024-04-12 PROBLEM — F19.10 SUBSTANCE ABUSE (MULTI): Status: ACTIVE | Noted: 2024-04-12

## 2024-04-12 PROBLEM — Z12.4 CERVICAL CANCER SCREENING: Status: ACTIVE | Noted: 2024-04-12

## 2024-04-12 PROBLEM — O99.891 BACK PAIN AFFECTING PREGNANCY IN SECOND TRIMESTER (HHS-HCC): Status: RESOLVED | Noted: 2023-11-03 | Resolved: 2024-04-12

## 2024-04-12 PROBLEM — Z59.41 FOOD INSECURITY: Status: ACTIVE | Noted: 2024-04-12

## 2024-04-12 PROBLEM — O23.42 URINARY TRACT INFECTION IN MOTHER DURING SECOND TRIMESTER OF PREGNANCY (HHS-HCC): Status: RESOLVED | Noted: 2023-11-03 | Resolved: 2024-04-12

## 2024-04-12 NOTE — PROGRESS NOTES
"Assessment   Problem List Items Addressed This Visit             ICD-10-CM       Medium    Cervical cancer screening Z12.4    Relevant Orders    THINPREP PAP TEST    Food insecurity Z59.41    Relevant Orders    Referral to [facility]    High-risk pregnancy ([facility]) O09.90    Relevant Medications    acetaminophen (Tylenol) 325 mg tablet    Other Relevant Orders    [facility] OB imaging order    Pregnancy test positive ([facility]) - Primary Z32.01    Substance abuse ([facility]) F19.10    Vaginal itching N89.8    Relevant Medications    terconazole (Terazol 7) 0.4 % vaginal cream    Other Relevant Orders    Vaginitis Gram Stain For Bacterial Vaginosis + Yeast (Completed)       Plan   NOB plan: New OB resources provided and reviewed with particular attention to dietary, travel, and medication restrictions  Oriented to practice, CNM vs. MD care  Reviewed bleeding precautions, warning signs, when to call provider; phone number provided  Routine NOB labs ordered  Anatomy ultrasound ordered  Transfer care to MD due to Substance abuse  Return in 1 weeks for routine prenatal care    Coordination of care during this visit: > 1 hour spent   Social work called to see patient, provided transportation home   [facility] referral   Pt. Added in for anatomy ultrasound to be done today   Dr. Thomas contacted and pt. Enrolled in [facility]   Pt. Has scheduled appointment on     JD Sun-TERAR Whiteanaya Joy is a 28 y.o.  at 19w3d with a working estimated date of delivery of 9/3/2024, by Ultrasound who presents for an initial prenatal visit. This pregnancy is unplanned.    Pt. Was seen in triage and added to [facility] 1200 schedule for new OB visit. 19+ weeks.     [place]ishan living at [facility]. \"I don't want to be there.\"  Reports sober x 1 month from cocaine and marijuana.   Reports partner uses drugs also.   Interested in [facility] program   Patient currently experiencing:  vaginal itching " "  Bleeding or cramping since LMP: no  Taking prenatal vitamin: No  Ultrasound completed this pregnancy: Yes - dated by femur length   Last pap: due today, 3 years ago     OB History    Para Term  AB Living   8 4 4 0 3 4   SAB IAB Ectopic Multiple Live Births   2 1     4      # Outcome Date GA Lbr Carlos/2nd Weight Sex Delivery Anes PTL Lv   8 Current            7 Term 10/23/22    F    ANDERS   6 Term 11/10/19    F Vag-Spont   ANDERS   5 Term 16   3.685 kg M Vag-Spont   ANDERS   4 Term 05/13/15   3.657 kg M Vag-Spont   ANDERS   3 SAB            2 SAB            1 IAB              [place]  Depression Scale Total: 9  Prior pregnancy complications: maternal chemical dependency    History of hypertension:  Unknown    Past Medical History:   Diagnosis Date    Supervision of pregnancy with grand multiparity, third trimester ([facility]) 2019    High risk multigravida in third trimester    Supervision of pregnancy with history of pre-term labor, unspecified trimester ([facility]) 2014    Previous  delivery, antepartum      Past Surgical History:   Procedure Laterality Date    INDUCED       OTHER SURGICAL HISTORY  2014    Reported Prior Surgical / Procedural History      Social History     Socioeconomic History    Marital status: Single     Spouse name: None    Number of children: None    Years of education: None    Highest education level: None   Occupational History    None   Tobacco Use    Smoking status: Every Day     Current packs/day: 0.25     Types: Cigarettes    Smokeless tobacco: Never   Vaping Use    Vaping status: None   Substance and Sexual Activity    Alcohol use: Never    Drug use: Not Currently     Types: \"Crack\" cocaine, Marijuana     Comment: 1 month sober    Sexual activity: Not Currently   Other Topics Concern    None   Social History Narrative    None     Social Determinants of Health     Financial Resource Strain: High Risk (3/18/2024)    Overall Financial " Resource Strain (CARDIA)     Difficulty of Paying Living Expenses: Very hard   Food Insecurity: No Food Insecurity (2024)    Hunger Vital Sign     Worried About Running Out of Food in the Last Year: Never true     Ran Out of Food in the Last Year: Never true   Recent Concern: Food Insecurity - Food Insecurity Present (3/18/2024)    Hunger Vital Sign     Worried About Running Out of Food in the Last Year: Often true     Ran Out of Food in the Last Year: Often true   Transportation Needs: Unmet Transportation Needs (3/18/2024)    PRAPARE - Transportation     Lack of Transportation (Medical): Yes     Lack of Transportation (Non-Medical): Yes   Physical Activity: Not on File (2022)    Received from [facility]     Physical Activity     Physical Activity: 0   Stress: Not on File (2022)    Received from [facility]     Stress     Stress: 0   Social Connections: Not on File (2022)    Received from [facility]     Social Connections     Social Connections and Isolation: 0   Intimate Partner Violence: Not on file        Objective   Physical Exam  Weight: 105 kg (231 lb)  TWG: Not found.   Expected Total Weight Gain: Could not be calculated   [place] BMI: Could not be calculated  BP: 104/58    Review of Systems     Physical Exam  Constitutional:       Appearance: She is obese.   Genitourinary:      Genitourinary Comments: Thick/white vaginal discharge coating labia minora    Cardiovascular:      Rate and Rhythm: Normal rate and regular rhythm.   Pulmonary:      Effort: Pulmonary effort is normal.      Breath sounds: Normal breath sounds.   Neurological:      Mental Status: She is alert.   Skin:     General: Skin is warm and dry.   Psychiatric:         Mood and Affect: Mood is anxious. Affect is labile.         Behavior: Behavior is hyperactive.          Postpartum Depression: Medium Risk (2024)    [place]  Depression Scale     Last EPDS Total Score: 9     Last EPDS Self Harm Result: Never         Pregnancy Problems (from 04/06/24 to present)       No problems associated with this episode.

## 2024-04-13 ENCOUNTER — DOCUMENTATION (OUTPATIENT)
Dept: OBSTETRICS AND GYNECOLOGY | Facility: CLINIC | Age: 29
End: 2024-04-13
Payer: MEDICAID

## 2024-04-15 ENCOUNTER — TELEPHONE (OUTPATIENT)
Dept: PEDIATRICS | Facility: CLINIC | Age: 29
End: 2024-04-15
Payer: MEDICAID

## 2024-04-16 ENCOUNTER — TELEPHONE (OUTPATIENT)
Dept: PEDIATRICS | Facility: CLINIC | Age: 29
End: 2024-04-16
Payer: MEDICAID

## 2024-04-16 NOTE — TELEPHONE ENCOUNTER
LAURIE KOROMA contacted [facility], where patient was staying to get an update on the pt's whereabouts as she was a no show for her appointment today.  Staff there shared that pt left AMA several days ago and they haven't heard from her since.

## 2024-04-19 LAB
CYTOLOGY CMNT CVX/VAG CYTO-IMP: NORMAL
HPV HR 12 DNA GENITAL QL NAA+PROBE: NEGATIVE
HPV HR GENOTYPES PNL CVX NAA+PROBE: NEGATIVE
HPV16 DNA SPEC QL NAA+PROBE: NEGATIVE
HPV18 DNA SPEC QL NAA+PROBE: NEGATIVE
LAB AP HPV GENOTYPE QUESTION: YES
LAB AP HPV HR: NORMAL
LABORATORY COMMENT REPORT: NORMAL
PATH REPORT.TOTAL CANCER: NORMAL

## 2024-04-22 ENCOUNTER — PATIENT OUTREACH (OUTPATIENT)
Dept: CARE COORDINATION | Facility: CLINIC | Age: 29
End: 2024-04-22
Payer: MEDICAID

## 2024-05-08 ENCOUNTER — PATIENT OUTREACH (OUTPATIENT)
Dept: CARE COORDINATION | Facility: CLINIC | Age: 29
End: 2024-05-08
Payer: MEDICAID

## 2024-05-08 NOTE — PROGRESS NOTES
Attempts made to reach patient for GOLD outreach and no contact made after LVM w/ my name and contact information.  Aracely Russell RN

## 2024-07-03 ENCOUNTER — HOSPITAL ENCOUNTER (OUTPATIENT)
Facility: HOSPITAL | Age: 29
Discharge: HOME | End: 2024-07-03
Attending: STUDENT IN AN ORGANIZED HEALTH CARE EDUCATION/TRAINING PROGRAM | Admitting: STUDENT IN AN ORGANIZED HEALTH CARE EDUCATION/TRAINING PROGRAM
Payer: MEDICAID

## 2024-07-03 DIAGNOSIS — Z32.01 PREGNANCY TEST POSITIVE (HHS-HCC): Primary | ICD-10-CM

## 2024-07-03 PROCEDURE — 99214 OFFICE O/P EST MOD 30 MIN: CPT | Performed by: ADVANCED PRACTICE MIDWIFE

## 2024-07-03 PROCEDURE — 99214 OFFICE O/P EST MOD 30 MIN: CPT

## 2024-07-03 RX ORDER — LABETALOL HYDROCHLORIDE 5 MG/ML
20 INJECTION, SOLUTION INTRAVENOUS ONCE AS NEEDED
Status: DISCONTINUED | OUTPATIENT
Start: 2024-07-03 | End: 2024-07-04 | Stop reason: HOSPADM

## 2024-07-03 RX ORDER — NIFEDIPINE 10 MG/1
10 CAPSULE ORAL ONCE AS NEEDED
Status: DISCONTINUED | OUTPATIENT
Start: 2024-07-03 | End: 2024-07-04 | Stop reason: HOSPADM

## 2024-07-03 RX ORDER — ONDANSETRON HYDROCHLORIDE 2 MG/ML
4 INJECTION, SOLUTION INTRAVENOUS EVERY 6 HOURS PRN
Status: DISCONTINUED | OUTPATIENT
Start: 2024-07-03 | End: 2024-07-04 | Stop reason: HOSPADM

## 2024-07-03 RX ORDER — ONDANSETRON 4 MG/1
4 TABLET, FILM COATED ORAL EVERY 6 HOURS PRN
Status: DISCONTINUED | OUTPATIENT
Start: 2024-07-03 | End: 2024-07-04 | Stop reason: HOSPADM

## 2024-07-03 RX ORDER — HYDRALAZINE HYDROCHLORIDE 20 MG/ML
5 INJECTION INTRAMUSCULAR; INTRAVENOUS ONCE AS NEEDED
Status: DISCONTINUED | OUTPATIENT
Start: 2024-07-03 | End: 2024-07-04 | Stop reason: HOSPADM

## 2024-07-03 RX ORDER — LIDOCAINE HYDROCHLORIDE 10 MG/ML
0.5 INJECTION INFILTRATION; PERINEURAL ONCE AS NEEDED
Status: DISCONTINUED | OUTPATIENT
Start: 2024-07-03 | End: 2024-07-04 | Stop reason: HOSPADM

## 2024-07-03 SDOH — HEALTH STABILITY: MENTAL HEALTH: HAVE YOU USED ANY SUBSTANCES (CANABIS, COCAINE, HEROIN, HALLUCINOGENS, INHALANTS, ETC.) IN THE PAST 12 MONTHS?: NO

## 2024-07-03 SDOH — HEALTH STABILITY: MENTAL HEALTH: WISH TO BE DEAD (PAST 1 MONTH): NO

## 2024-07-03 SDOH — SOCIAL STABILITY: SOCIAL INSECURITY: HAS ANYONE EVER THREATENED TO HURT YOUR FAMILY OR YOUR PETS?: NO

## 2024-07-03 SDOH — SOCIAL STABILITY: SOCIAL INSECURITY: DOES ANYONE TRY TO KEEP YOU FROM HAVING/CONTACTING OTHER FRIENDS OR DOING THINGS OUTSIDE YOUR HOME?: NO

## 2024-07-03 SDOH — HEALTH STABILITY: MENTAL HEALTH: SUICIDAL BEHAVIOR (LIFETIME): NO

## 2024-07-03 SDOH — ECONOMIC STABILITY: HOUSING INSECURITY: DO YOU FEEL UNSAFE GOING BACK TO THE PLACE WHERE YOU ARE LIVING?: NO

## 2024-07-03 SDOH — SOCIAL STABILITY: SOCIAL INSECURITY: ARE THERE ANY APPARENT SIGNS OF INJURIES/BEHAVIORS THAT COULD BE RELATED TO ABUSE/NEGLECT?: NO

## 2024-07-03 SDOH — HEALTH STABILITY: MENTAL HEALTH: HAVE YOU USED ANY PRESCRIPTION DRUGS OTHER THAN PRESCRIBED IN THE PAST 12 MONTHS?: NO

## 2024-07-03 SDOH — SOCIAL STABILITY: SOCIAL INSECURITY: ARE YOU OR HAVE YOU BEEN THREATENED OR ABUSED PHYSICALLY, EMOTIONALLY, OR SEXUALLY BY ANYONE?: NO

## 2024-07-03 SDOH — HEALTH STABILITY: MENTAL HEALTH: NON-SPECIFIC ACTIVE SUICIDAL THOUGHTS (PAST 1 MONTH): NO

## 2024-07-03 SDOH — SOCIAL STABILITY: SOCIAL INSECURITY: HAVE YOU HAD ANY THOUGHTS OF HARMING ANYONE ELSE?: NO

## 2024-07-03 SDOH — SOCIAL STABILITY: SOCIAL INSECURITY: HAVE YOU HAD THOUGHTS OF HARMING ANYONE ELSE?: YES

## 2024-07-03 SDOH — SOCIAL STABILITY: SOCIAL INSECURITY: DO YOU FEEL ANYONE HAS EXPLOITED OR TAKEN ADVANTAGE OF YOU FINANCIALLY OR OF YOUR PERSONAL PROPERTY?: NO

## 2024-07-03 SDOH — SOCIAL STABILITY: SOCIAL INSECURITY: ABUSE SCREEN: ADULT

## 2024-07-03 SDOH — SOCIAL STABILITY: SOCIAL INSECURITY: VERBAL ABUSE: DENIES

## 2024-07-03 SDOH — SOCIAL STABILITY: SOCIAL INSECURITY: PHYSICAL ABUSE: DENIES

## 2024-07-03 ASSESSMENT — LIFESTYLE VARIABLES
HOW MANY STANDARD DRINKS CONTAINING ALCOHOL DO YOU HAVE ON A TYPICAL DAY: PATIENT DOES NOT DRINK
SKIP TO QUESTIONS 9-10: 1
AUDIT-C TOTAL SCORE: 0
HOW OFTEN DO YOU HAVE 6 OR MORE DRINKS ON ONE OCCASION: NEVER
AUDIT-C TOTAL SCORE: 0
HOW OFTEN DO YOU HAVE A DRINK CONTAINING ALCOHOL: NEVER

## 2024-07-03 ASSESSMENT — PAIN SCALES - GENERAL
PAINLEVEL_OUTOF10: 0 - NO PAIN
PAINLEVEL_OUTOF10: 0 - NO PAIN
PAINLEVEL_OUTOF10: 9

## 2024-07-03 ASSESSMENT — PAIN - FUNCTIONAL ASSESSMENT: PAIN_FUNCTIONAL_ASSESSMENT: 0-10

## 2024-07-03 ASSESSMENT — PATIENT HEALTH QUESTIONNAIRE - PHQ9
SUM OF ALL RESPONSES TO PHQ9 QUESTIONS 1 & 2: 0
2. FEELING DOWN, DEPRESSED OR HOPELESS: NOT AT ALL
1. LITTLE INTEREST OR PLEASURE IN DOING THINGS: NOT AT ALL

## 2024-07-04 ENCOUNTER — HOSPITAL ENCOUNTER (OUTPATIENT)
Facility: HOSPITAL | Age: 29
End: 2024-07-04
Attending: STUDENT IN AN ORGANIZED HEALTH CARE EDUCATION/TRAINING PROGRAM | Admitting: STUDENT IN AN ORGANIZED HEALTH CARE EDUCATION/TRAINING PROGRAM
Payer: MEDICAID

## 2024-07-04 VITALS
HEIGHT: 63 IN | SYSTOLIC BLOOD PRESSURE: 129 MMHG | WEIGHT: 229.28 LBS | OXYGEN SATURATION: 99 % | RESPIRATION RATE: 18 BRPM | HEART RATE: 79 BPM | BODY MASS INDEX: 40.62 KG/M2 | TEMPERATURE: 98.2 F | DIASTOLIC BLOOD PRESSURE: 72 MMHG

## 2024-07-04 PROCEDURE — 99214 OFFICE O/P EST MOD 30 MIN: CPT

## 2024-07-04 NOTE — ED TRIAGE NOTES
Pt to ED with c/o cramping abdominal pain that started about 90 minutes prior to arrival. The pain is similar to when she went into labor with previous children. Denies loss of fluids, vaginal bleeding, or falls.     32 weeks OB. . Hx of  and miscarriage.     Hx of epilepsy.

## 2024-07-04 NOTE — H&P
"Obstetrical Admission History and Physical    HPI   Leigh Ann Joy is a 28 y.o.  at 31w2d, hans 2024, by Ultrasound who presents reporting contractions during intercourse. The pt had to wait quite a while to see a provider and at this point wants to go. Her NST is discontinuous and incomplete but the sections we see are category 1. She denies bleeding or leaking.     Pregnancy notable for:   Polysubstance abuse  epilepsy  History syphilis, unknown current status  Does not have custody of children  History gc and trich    Obstetrical History   OB History    Para Term  AB Living   8 4 4 0 3 4   SAB IAB Ectopic Multiple Live Births   2 1     4      # Outcome Date GA Lbr Carlos/2nd Weight Sex Delivery Anes PTL Lv   8 Current            7 Term 10/23/21    F    ANDERS   6 Term 11/10/19    F Vag-Spont   ANDERS   5 Term 16   3.685 kg M Vag-Spont   ANDERS   4 Term 05/13/15   3.657 kg M Vag-Spont   ANDERS   3 SAB            2 SAB            1 IAB              Past Medical History  Past Medical History:   Diagnosis Date    Supervision of pregnancy with grand multiparity, third trimester ([facility]) 2019    High risk multigravida in third trimester    Supervision of pregnancy with history of pre-term labor, unspecified trimester ([facility]) 2014    Previous  delivery, antepartum      Past Surgical History   Past Surgical History:   Procedure Laterality Date    INDUCED       OTHER SURGICAL HISTORY  2014    Reported Prior Surgical / Procedural History     Social History  Social History     Tobacco Use    Smoking status: Every Day     Current packs/day: 0.25     Types: Cigarettes    Smokeless tobacco: Never   Substance Use Topics    Alcohol use: Never     Substance and Sexual Activity   Drug Use Not Currently    Types: \"Crack\" cocaine, Marijuana    Comment: 1 month sober     Allergies  Penicillins   Medications  Medications Prior to Admission   Medication Sig Dispense Refill Last Dose    " diphenhydrAMINE (Sominex) 25 mg tablet Take 1 tablet (25 mg) by mouth as needed at bedtime for sleep. 30 tablet 2 Unknown    ondansetron (Zofran) 4 mg tablet Take 1 tablet (4 mg) by mouth every 12 hours if needed for nausea or vomiting. Take 1 tablet (4 mg) by mouth twice a day as needed for 7 days. 14 tablet 0 Unknown    prenatal vitamin, iron-folic, 27 mg iron-800 mcg folic acid tablet Take 1 tablet by mouth once daily. 30 tablet 2 Unknown     Review of Systems  Please see HPI for reported pertinent positives, which would supersede this ROS    Constitutional:  Denies fever, chills, wt gain or loss, fatigue  Genito-Urinary:  Denies genital lesion or sores, vaginal dryness, itching  or pain.  No abnormal vaginal discharge or unexplained vaginal bleeding.  No dysuria, urinary incontinence or frequency.  Denies pelvic pain, dysmenorrhea or dyspareunia.  Eyes:  Denies vision changes, dryness  ENT:  No hearing loss, sinus pain or congestion, nosebleeds  Cardiovascular:  No chest pain or palpitations  Respiratory:  No SOB, cough, wheezing  GI:  No Nausea, vomiting, diarrhea, constipation, abdominal pain  Musculoskeletal:  No new back pain. joint pain, peripheral edema  Skin:  No rash or skin lesion  Neurologic:  No HA, numbness or dizziness  Psychiatric:  No new anxiety or depression  Endocrine:  No loss of hair or hirsutism  Hematologic/lymphatic:  No swollen lymph nodes.  No reported tendency for easy bruising or bleeding    Patient admits to no other systemic complaints    Objective    Last Vitals  Temp Pulse Resp BP MAP O2 Sat   36.3 °C (97.4 °F) 79 18 129/72   99 %     Physical Examination  OBGyn Exam   Unable to perform exam as pt anxious to leave  Pt is very jittery and itchy. She is very anxious to leave.    , no accels, no decels  [place]:  none    Assessment   at 31w2d  Unlikely in PTL  Reassuring but incomplete nst    Plan  Discussed plan with Dr. Thomas. We will give Dr. Thomas's coordinator pt's  contact info to coordinate care.  Will send rx PNV per pt's request

## 2024-07-12 ENCOUNTER — TELEPHONE (OUTPATIENT)
Dept: OBSTETRICS AND GYNECOLOGY | Facility: CLINIC | Age: 29
End: 2024-07-12
Payer: MEDICAID

## 2024-07-12 ENCOUNTER — DOCUMENTATION (OUTPATIENT)
Dept: OBSTETRICS AND GYNECOLOGY | Facility: CLINIC | Age: 29
End: 2024-07-12
Payer: MEDICAID

## 2024-07-12 NOTE — CONSULTS
FRANCI/LAURIE mailed outreach letter to patient at last known address as patient is lost to follow.

## 2024-07-12 NOTE — TELEPHONE ENCOUNTER
----- Message from Isaias MANE sent at 7/11/2024  4:37 PM EDT -----  Regarding: RE: Needs Apptmt in [facility]  No, we never heard back from her after finding out that she left [facility]. She did not have a working phone. I'll send a letter to the last known address.  ----- Message -----  From: Vy Virgen RN  Sent: 7/11/2024  12:03 PM EDT  To: Traci Thomas MD; SELINA Benitez; #  Subject: RE: Needs Apptmt in [facility]                         Any luck reaching her?  ----- Message -----  From: SELINA Melendez  Sent: 7/5/2024   9:01 AM EDT  To: Traci Thomas MD; SELINA Benitez; #  Subject: RE: Needs Apptmt in [facility]                         She was referred back in April.  I'll try again today.    ----- Message -----  From: Traci Thomas MD  Sent: 7/3/2024  11:14 PM EDT  To: SELINA Benitez; SELINA Melendez; #  Subject: Needs Apptmt in [facility]                             Patient with limited PNC, came into triage for contractions, left before really being assessed.  Hx cocaine use; per CNM who saw her, seemed intoxicated.  Patient recommended for FU in [facility].  Can you try to contact her and get her scheduled? She will prob need transportation assistance. Also, she really wants a growth US, so that may be our enticement for her to come in.    Address given: [address], [place] [address]  Phone given: [phone]    Thanks!,  Dr. Thomas

## 2024-07-23 ENCOUNTER — HOSPITAL ENCOUNTER (OUTPATIENT)
Facility: HOSPITAL | Age: 29
Discharge: LEFT BEFORE DISCHARGE | End: 2024-07-23
Attending: OBSTETRICS & GYNECOLOGY | Admitting: OBSTETRICS & GYNECOLOGY
Payer: MEDICAID

## 2024-07-23 VITALS
DIASTOLIC BLOOD PRESSURE: 56 MMHG | HEART RATE: 88 BPM | WEIGHT: 219.58 LBS | BODY MASS INDEX: 38.91 KG/M2 | TEMPERATURE: 98.1 F | RESPIRATION RATE: 18 BRPM | SYSTOLIC BLOOD PRESSURE: 115 MMHG | OXYGEN SATURATION: 93 % | HEIGHT: 63 IN

## 2024-07-23 LAB
ABO GROUP (TYPE) IN BLOOD: NORMAL
ALBUMIN SERPL BCP-MCNC: 3 G/DL (ref 3.4–5)
ALP SERPL-CCNC: 114 U/L (ref 33–110)
ALT SERPL W P-5'-P-CCNC: 28 U/L (ref 7–45)
ANION GAP SERPL CALC-SCNC: 13 MMOL/L (ref 10–20)
ANTIBODY SCREEN: NORMAL
AST SERPL W P-5'-P-CCNC: 21 U/L (ref 9–39)
BILIRUB SERPL-MCNC: 0.3 MG/DL (ref 0–1.2)
BUN SERPL-MCNC: 12 MG/DL (ref 6–23)
CALCIUM SERPL-MCNC: 8.2 MG/DL (ref 8.6–10.6)
CHLORIDE SERPL-SCNC: 107 MMOL/L (ref 98–107)
CO2 SERPL-SCNC: 20 MMOL/L (ref 21–32)
CREAT SERPL-MCNC: 0.49 MG/DL (ref 0.5–1.05)
CREAT UR-MCNC: 58.5 MG/DL (ref 20–320)
EGFRCR SERPLBLD CKD-EPI 2021: >90 ML/MIN/1.73M*2
ERYTHROCYTE [DISTWIDTH] IN BLOOD BY AUTOMATED COUNT: 13.3 % (ref 11.5–14.5)
GLUCOSE 1H P 50 G GLC PO SERPL-MCNC: 131 MG/DL
GLUCOSE SERPL-MCNC: 131 MG/DL (ref 74–99)
HCT VFR BLD AUTO: 34.2 % (ref 36–46)
HGB BLD-MCNC: 11.6 G/DL (ref 12–16)
LDH SERPL L TO P-CCNC: 105 U/L (ref 84–246)
MCH RBC QN AUTO: 30.4 PG (ref 26–34)
MCHC RBC AUTO-ENTMCNC: 33.9 G/DL (ref 32–36)
MCV RBC AUTO: 90 FL (ref 80–100)
NRBC BLD-RTO: 0 /100 WBCS (ref 0–0)
PLATELET # BLD AUTO: 282 X10*3/UL (ref 150–450)
POC APPEARANCE, URINE: CLEAR
POC BILIRUBIN, URINE: NEGATIVE
POC BLOOD, URINE: NEGATIVE
POC COLOR, URINE: YELLOW
POC GLUCOSE, URINE: NEGATIVE MG/DL
POC KETONES, URINE: NEGATIVE MG/DL
POC LEUKOCYTES, URINE: ABNORMAL
POC NITRITE,URINE: NEGATIVE
POC PH, URINE: 5.5 PH
POC PROTEIN, URINE: NEGATIVE MG/DL
POC SPECIFIC GRAVITY, URINE: 1.02
POC UROBILINOGEN, URINE: 1 EU/DL
POTASSIUM SERPL-SCNC: 3.7 MMOL/L (ref 3.5–5.3)
PROT SERPL-MCNC: 5.7 G/DL (ref 6.4–8.2)
PROT UR-ACNC: 9 MG/DL (ref 5–24)
PROT/CREAT UR: 0.15 MG/MG CREAT (ref 0–0.17)
RBC # BLD AUTO: 3.82 X10*6/UL (ref 4–5.2)
RH FACTOR (ANTIGEN D): NORMAL
SODIUM SERPL-SCNC: 136 MMOL/L (ref 136–145)
TREPONEMA PALLIDUM IGG+IGM AB [PRESENCE] IN SERUM OR PLASMA BY IMMUNOASSAY: REACTIVE
WBC # BLD AUTO: 9 X10*3/UL (ref 4.4–11.3)

## 2024-07-23 PROCEDURE — 86780 TREPONEMA PALLIDUM: CPT

## 2024-07-23 PROCEDURE — 36415 COLL VENOUS BLD VENIPUNCTURE: CPT

## 2024-07-23 PROCEDURE — 86901 BLOOD TYPING SEROLOGIC RH(D): CPT

## 2024-07-23 PROCEDURE — 85027 COMPLETE CBC AUTOMATED: CPT

## 2024-07-23 PROCEDURE — 4500999001 HC ED NO CHARGE

## 2024-07-23 PROCEDURE — 82947 ASSAY GLUCOSE BLOOD QUANT: CPT

## 2024-07-23 PROCEDURE — 99213 OFFICE O/P EST LOW 20 MIN: CPT

## 2024-07-23 PROCEDURE — 82570 ASSAY OF URINE CREATININE: CPT

## 2024-07-23 PROCEDURE — 87086 URINE CULTURE/COLONY COUNT: CPT

## 2024-07-23 PROCEDURE — 59025 FETAL NON-STRESS TEST: CPT

## 2024-07-23 PROCEDURE — 80053 COMPREHEN METABOLIC PANEL: CPT

## 2024-07-23 PROCEDURE — 86318 IA INFECTIOUS AGENT ANTIBODY: CPT

## 2024-07-23 PROCEDURE — 99214 OFFICE O/P EST MOD 30 MIN: CPT | Mod: 25

## 2024-07-23 PROCEDURE — 83615 LACTATE (LD) (LDH) ENZYME: CPT

## 2024-07-23 PROCEDURE — 87661 TRICHOMONAS VAGINALIS AMPLIF: CPT

## 2024-07-23 PROCEDURE — 2500000001 HC RX 250 WO HCPCS SELF ADMINISTERED DRUGS (ALT 637 FOR MEDICARE OP)

## 2024-07-23 RX ORDER — DIPHENHYDRAMINE HCL 25 MG
25 CAPSULE ORAL ONCE
Status: COMPLETED | OUTPATIENT
Start: 2024-07-23 | End: 2024-07-23

## 2024-07-23 SDOH — ECONOMIC STABILITY: HOUSING INSECURITY: DO YOU FEEL UNSAFE GOING BACK TO THE PLACE WHERE YOU ARE LIVING?: NO

## 2024-07-23 SDOH — SOCIAL STABILITY: SOCIAL INSECURITY: ARE THERE ANY APPARENT SIGNS OF INJURIES/BEHAVIORS THAT COULD BE RELATED TO ABUSE/NEGLECT?: NO

## 2024-07-23 SDOH — SOCIAL STABILITY: SOCIAL INSECURITY: VERBAL ABUSE: DENIES

## 2024-07-23 SDOH — HEALTH STABILITY: MENTAL HEALTH: STRENGTHS (MUST CHOOSE TWO): DEMONSTRATES EFFECTIVE COPING SKILLS;SUPPORT FROM FAMILY

## 2024-07-23 SDOH — SOCIAL STABILITY: SOCIAL INSECURITY: ABUSE SCREEN: ADULT

## 2024-07-23 SDOH — HEALTH STABILITY: MENTAL HEALTH: WERE YOU ABLE TO COMPLETE ALL THE BEHAVIORAL HEALTH SCREENINGS?: YES

## 2024-07-23 SDOH — HEALTH STABILITY: MENTAL HEALTH: HAVE YOU USED ANY SUBSTANCES (CANABIS, COCAINE, HEROIN, HALLUCINOGENS, INHALANTS, ETC.) IN THE PAST 12 MONTHS?: NO

## 2024-07-23 SDOH — HEALTH STABILITY: MENTAL HEALTH: SUICIDAL BEHAVIOR (LIFETIME): NO

## 2024-07-23 SDOH — SOCIAL STABILITY: SOCIAL INSECURITY: DOES ANYONE TRY TO KEEP YOU FROM HAVING/CONTACTING OTHER FRIENDS OR DOING THINGS OUTSIDE YOUR HOME?: NO

## 2024-07-23 SDOH — HEALTH STABILITY: MENTAL HEALTH: NON-SPECIFIC ACTIVE SUICIDAL THOUGHTS (PAST 1 MONTH): NO

## 2024-07-23 SDOH — HEALTH STABILITY: MENTAL HEALTH: WISH TO BE DEAD (PAST 1 MONTH): NO

## 2024-07-23 SDOH — SOCIAL STABILITY: SOCIAL INSECURITY: HAVE YOU HAD ANY THOUGHTS OF HARMING ANYONE ELSE?: NO

## 2024-07-23 SDOH — SOCIAL STABILITY: SOCIAL INSECURITY: ARE YOU OR HAVE YOU BEEN THREATENED OR ABUSED PHYSICALLY, EMOTIONALLY, OR SEXUALLY BY ANYONE?: NO

## 2024-07-23 SDOH — HEALTH STABILITY: MENTAL HEALTH: HAVE YOU USED ANY PRESCRIPTION DRUGS OTHER THAN PRESCRIBED IN THE PAST 12 MONTHS?: NO

## 2024-07-23 SDOH — SOCIAL STABILITY: SOCIAL INSECURITY: HAS ANYONE EVER THREATENED TO HURT YOUR FAMILY OR YOUR PETS?: NO

## 2024-07-23 SDOH — SOCIAL STABILITY: SOCIAL INSECURITY: HAVE YOU HAD THOUGHTS OF HARMING ANYONE ELSE?: NO

## 2024-07-23 SDOH — SOCIAL STABILITY: SOCIAL INSECURITY: DO YOU FEEL ANYONE HAS EXPLOITED OR TAKEN ADVANTAGE OF YOU FINANCIALLY OR OF YOUR PERSONAL PROPERTY?: NO

## 2024-07-23 SDOH — SOCIAL STABILITY: SOCIAL INSECURITY: PHYSICAL ABUSE: DENIES

## 2024-07-23 ASSESSMENT — PAIN SCALES - GENERAL
PAINLEVEL_OUTOF10: 0 - NO PAIN

## 2024-07-23 ASSESSMENT — PATIENT HEALTH QUESTIONNAIRE - PHQ9
2. FEELING DOWN, DEPRESSED OR HOPELESS: NOT AT ALL
1. LITTLE INTEREST OR PLEASURE IN DOING THINGS: NOT AT ALL
SUM OF ALL RESPONSES TO PHQ9 QUESTIONS 1 & 2: 0

## 2024-07-23 ASSESSMENT — LIFESTYLE VARIABLES
AUDIT-C TOTAL SCORE: 0
AUDIT-C TOTAL SCORE: 0
HOW OFTEN DO YOU HAVE A DRINK CONTAINING ALCOHOL: NEVER
HOW MANY STANDARD DRINKS CONTAINING ALCOHOL DO YOU HAVE ON A TYPICAL DAY: PATIENT DOES NOT DRINK
HOW OFTEN DO YOU HAVE 6 OR MORE DRINKS ON ONE OCCASION: NEVER
SKIP TO QUESTIONS 9-10: 1

## 2024-07-23 ASSESSMENT — ACTIVITIES OF DAILY LIVING (ADL): LACK_OF_TRANSPORTATION: NO

## 2024-07-23 NOTE — PROGRESS NOTES
"Social Work Assessment     Patient: Leigh Ann Joy, 29yo, , SRI 24  [facility] [address] [facility]  Phone: [phone]    Referral Reason: substance use disorder, ppnc  Prenatal Care: none  Barriers: reports transportation was previously a barrier, states will not be a barrier any longer as \"fiancee\" Rambo can provide transportation    Other Children: Ms Joy has 4 older children, does not have custody at this time    FOB: not currently involved    Household Composition: Ms Joy confirms she is no longer at [place], reports she is now living with her vidya (and reportedly his ex-wife)    Supports: Ms Joy identifies vidya as support    Substance Use History: Ms Joy declines treatment resources at this time, states vidya is in recovery and is supporting her. Per OB team, she reports last use of crack/cocaine over the weekend.    Department of Children and Family Services (DCFS): Ms Joy with history of DCFS involvement and no custody of her other children. SW familiar with her from previous pregnancies/deliveries. SW reviewed DCFS involvement at delivery and encouraged Ms Joy to connect with formal treatment for recovery and as DCFS would look for stability and treatment. Ms Joy declining at this time, aware she can reach out as needed and aware [facility] SW will also reach out.     Plan: SW will remain involved throughout pregnancy as needed and will see at delivery. Please message as urgent needs noted.     Signature: SELINA Woods      "

## 2024-07-23 NOTE — H&P
Obstetrical TRIAGE History and Physical     Leigh Ann Joy is a 28 y.o. . 34w1d d/b 19wk US    Chief Complaint: Rash    Assessment/Plan    Rash  - 0.5cm discrete papular pruritic lesions on b/l extremities and abdomen, spares back, does not appear infected   - given dose of benadryl for pruritus   - discussed c/f secondary syphilis rash and possible re-infection   - paged dermatology, they will schedule pt in acute clinic this week     Syphilis  - Dx Oct 2023 confirmed with TPPA, patient re-treated in 2024 given rash however RPR was neg at that time  - Per patient has received 6 doses of PCN total  - RPR has been negative this pregnancy   - 3rd trimester screening preformed, antibody +, RPR pending     Hx Trichomonas  - Trich + in April, treated   - GOLD collected     Mild Range BP  - x1 mild range BP in triage, otherwise normotensive  - HELLP labs WNL  - PCR 0.15  - Discussed importance of follow up for PNC given c/f HDP  - Patient left prior to getting BP cuff     Hx DREW  - Uses crack cocaine, last use 2-3 days ago, denies withdrawal sx   - SW consulted, see note   - Has previously been referred to [facility]    IUP at 34w1d   - Limited PNC, x1 PNV 19 wk --> tasked RN at [facility] for appt scheduling (pt reported preferred location)   - Collected 3rd trimester labs today  - 1 hr GTT WNL   - NST non-reactive, decel at 1830, recommended prolonged monitoring --> however pt declined, reviewed risks for poor fetal outcomes and  demise however pt elected to leave     Maternal Well-being  - Vital signs stable  - Emotional support and reassurance provided  - All questions and concerns addressed     Dispo: patient chose to leave against medical advice. She was advised to stay for prolonged fetal monitoring given sporadic tracing throughout triage stay and large decel @1830. Patient reported she was hungry and had to leave. Offered multiple snacks throughout stay however patient was adamant about leaving. We  reviewed the risks of poor fetal outcomes and potential  demise given non-reassuring fetal tracing and lack of  surveillance thus far this pregnancy. Patient states she understands the risks. She was advised she can return to triage at any time for further fetal monitoring and someone from the office will reach out to schedule OB appt and US.      D/w Dr. Sarah Herrera, PA-C       Active Problems:  There are no active Hospital Problems.      Pregnancy Problems (from 24 to present)       No problems associated with this episode.          Subjective   Leigh Ann is here complaining of rash. States she has had the rash throughout pregnancy. States it is itchy. Has hx of homelessness and living in shelter. Currently lives with partner. Denies chance of new STI or re-infection. Good fetal movement. Denies vaginal bleeding., Denies contractions., Denies leaking of fluid.      Reports last use of crack cocaine 2-3 days ago. Denies that she is withdrawing. Reports no barriers to care since living with partner. Is amenable to establishing PNC at . Prefers [facility] location.      Obstetrical History   OB History    Para Term  AB Living   8 4 4 0 3 4   SAB IAB Ectopic Multiple Live Births   2 1     4      # Outcome Date GA Lbr Carlos/2nd Weight Sex Type Anes PTL Lv   8 Current            7 Term 10/23/21    F    ANDERS   6 Term 11/10/19    F Vag-Spont   ANDERS   5 Term 16   3.685 kg M Vag-Spont   ANDERS   4 Term 05/13/15   3.657 kg M Vag-Spont   ANDERS   3 SAB            2 SAB            1 IAB                Past Medical History  Past Medical History:   Diagnosis Date    Supervision of pregnancy with grand multiparity, third trimester ([facility]) 2019    High risk multigravida in third trimester    Supervision of pregnancy with history of pre-term labor, unspecified trimester ([facility]) 2014    Previous  delivery, antepartum        Past Surgical History   Past Surgical History:  "  Procedure Laterality Date    INDUCED       OTHER SURGICAL HISTORY  2014    Reported Prior Surgical / Procedural History       Social History  Social History     Tobacco Use    Smoking status: Every Day     Current packs/day: 0.25     Types: Cigarettes    Smokeless tobacco: Never   Substance Use Topics    Alcohol use: Never     Substance and Sexual Activity   Drug Use Not Currently    Types: \"Crack\" cocaine, Marijuana    Comment: 1 month sober       Allergies  Penicillins     Medications  Medications Prior to Admission   Medication Sig Dispense Refill Last Dose    diphenhydrAMINE (Sominex) 25 mg tablet Take 1 tablet (25 mg) by mouth as needed at bedtime for sleep. (Patient not taking: Reported on 2024) 30 tablet 2 More than a month    ondansetron (Zofran) 4 mg tablet Take 1 tablet (4 mg) by mouth every 12 hours if needed for nausea or vomiting. Take 1 tablet (4 mg) by mouth twice a day as needed for 7 days. (Patient not taking: Reported on 2024) 14 tablet 0 More than a month    prenatal vit,ihsan 74-iron-folic 27 mg iron- 1 mg tablet Take 1 tablet by mouth once daily. (Patient not taking: Reported on 2024) 90 each 3 More than a month    prenatal vitamin, iron-folic, 27 mg iron-800 mcg folic acid tablet Take 1 tablet by mouth once daily. (Patient not taking: Reported on 2024) 30 tablet 2 More than a month       Objective    Last Vitals  Temp Pulse Resp BP MAP O2 Sat   36.7 °C (98.1 °F) 88 18 115/56   99%      Physical Examination  GENERAL: Examination reveals a well developed, well nourished, gravid female in no acute distress. She is alert and cooperative.  ABDOMEN: soft, gravid, nontender, nondistended, no abnormal masses, no epigastric pain, FHT present  FHR is 140 BPM, with Accelerations, Variable decelerations, and a non-reactive NST  [place] reading:  no ctx   SKIN:  0.5cm discrete papular pruritic lesions on b/l extremities and abdomen, spares back, does not appear infected "     Lab Review  Labs in chart were reviewed.

## 2024-07-24 ENCOUNTER — TELEPHONE (OUTPATIENT)
Dept: OBSTETRICS AND GYNECOLOGY | Facility: HOSPITAL | Age: 29
End: 2024-07-24

## 2024-07-24 ENCOUNTER — HOSPITAL ENCOUNTER (OUTPATIENT)
Facility: HOSPITAL | Age: 29
Discharge: HOME | End: 2024-07-24
Attending: OBSTETRICS & GYNECOLOGY | Admitting: OBSTETRICS & GYNECOLOGY
Payer: MEDICAID

## 2024-07-24 ENCOUNTER — TELEPHONE (OUTPATIENT)
Dept: OBSTETRICS AND GYNECOLOGY | Facility: CLINIC | Age: 29
End: 2024-07-24

## 2024-07-24 ENCOUNTER — TELEPHONE (OUTPATIENT)
Dept: PEDIATRICS | Facility: CLINIC | Age: 29
End: 2024-07-24

## 2024-07-24 VITALS
HEART RATE: 102 BPM | SYSTOLIC BLOOD PRESSURE: 136 MMHG | RESPIRATION RATE: 18 BRPM | DIASTOLIC BLOOD PRESSURE: 74 MMHG | TEMPERATURE: 96.8 F | OXYGEN SATURATION: 99 %

## 2024-07-24 DIAGNOSIS — O09.30 LATE PRENATAL CARE (HHS-HCC): ICD-10-CM

## 2024-07-24 DIAGNOSIS — Z3A.34 34 WEEKS GESTATION OF PREGNANCY (HHS-HCC): ICD-10-CM

## 2024-07-24 DIAGNOSIS — A59.9 TRICHOMONAS INFECTION: Primary | ICD-10-CM

## 2024-07-24 DIAGNOSIS — Z59.41 FOOD INSECURITY: ICD-10-CM

## 2024-07-24 PROBLEM — O13.3 GESTATIONAL HYPERTENSION, THIRD TRIMESTER (HHS-HCC): Status: ACTIVE | Noted: 2024-07-24

## 2024-07-24 LAB
ALBUMIN SERPL BCP-MCNC: 3.2 G/DL (ref 3.4–5)
ALP SERPL-CCNC: 111 U/L (ref 33–110)
ALT SERPL W P-5'-P-CCNC: 31 U/L (ref 7–45)
ANION GAP SERPL CALC-SCNC: 15 MMOL/L (ref 10–20)
AST SERPL W P-5'-P-CCNC: 23 U/L (ref 9–39)
BILIRUB SERPL-MCNC: 0.4 MG/DL (ref 0–1.2)
BILIRUBIN, POC: ABNORMAL
BLOOD URINE, POC: NEGATIVE
BUN SERPL-MCNC: 8 MG/DL (ref 6–23)
CALCIUM SERPL-MCNC: 8.3 MG/DL (ref 8.6–10.6)
CHLORIDE SERPL-SCNC: 107 MMOL/L (ref 98–107)
CLARITY, POC: ABNORMAL
CO2 SERPL-SCNC: 18 MMOL/L (ref 21–32)
COLOR, POC: ABNORMAL
CREAT SERPL-MCNC: 0.51 MG/DL (ref 0.5–1.05)
CREAT UR-MCNC: 163.3 MG/DL (ref 20–320)
EGFRCR SERPLBLD CKD-EPI 2021: >90 ML/MIN/1.73M*2
ERYTHROCYTE [DISTWIDTH] IN BLOOD BY AUTOMATED COUNT: 13.4 % (ref 11.5–14.5)
GLUCOSE SERPL-MCNC: 135 MG/DL (ref 74–99)
GLUCOSE URINE, POC: NEGATIVE
HCT VFR BLD AUTO: 37.5 % (ref 36–46)
HGB BLD-MCNC: 12.2 G/DL (ref 12–16)
KETONES, POC: NEGATIVE
LDH SERPL L TO P-CCNC: 121 U/L (ref 84–246)
LEUKOCYTE EST, POC: ABNORMAL
MCH RBC QN AUTO: 29.6 PG (ref 26–34)
MCHC RBC AUTO-ENTMCNC: 32.5 G/DL (ref 32–36)
MCV RBC AUTO: 91 FL (ref 80–100)
NITRITE, POC: NEGATIVE
NRBC BLD-RTO: 0 /100 WBCS (ref 0–0)
PH, POC: 6
PLATELET # BLD AUTO: 291 X10*3/UL (ref 150–450)
POC APPEARANCE OF BODY FLUID: ABNORMAL
POTASSIUM SERPL-SCNC: 3.6 MMOL/L (ref 3.5–5.3)
PROT SERPL-MCNC: 6.1 G/DL (ref 6.4–8.2)
PROT UR-ACNC: 26 MG/DL (ref 5–24)
PROT/CREAT UR: 0.16 MG/MG CREAT (ref 0–0.17)
RBC # BLD AUTO: 4.12 X10*6/UL (ref 4–5.2)
RPR SER QL: NONREACTIVE
SODIUM SERPL-SCNC: 136 MMOL/L (ref 136–145)
SPECIFIC GRAVITY, POC: 1.03
T PALLIDUM AB SER QL AGGL: REACTIVE
T VAGINALIS RRNA SPEC QL NAA+PROBE: POSITIVE
URINE PROTEIN, POC: ABNORMAL
UROBILINOGEN, POC: 1
WBC # BLD AUTO: 8.7 X10*3/UL (ref 4.4–11.3)

## 2024-07-24 PROCEDURE — 81002 URINALYSIS NONAUTO W/O SCOPE: CPT | Performed by: STUDENT IN AN ORGANIZED HEALTH CARE EDUCATION/TRAINING PROGRAM

## 2024-07-24 PROCEDURE — 59025 FETAL NON-STRESS TEST: CPT | Performed by: STUDENT IN AN ORGANIZED HEALTH CARE EDUCATION/TRAINING PROGRAM

## 2024-07-24 PROCEDURE — 99215 OFFICE O/P EST HI 40 MIN: CPT

## 2024-07-24 PROCEDURE — 83615 LACTATE (LD) (LDH) ENZYME: CPT | Performed by: STUDENT IN AN ORGANIZED HEALTH CARE EDUCATION/TRAINING PROGRAM

## 2024-07-24 PROCEDURE — 85027 COMPLETE CBC AUTOMATED: CPT | Performed by: STUDENT IN AN ORGANIZED HEALTH CARE EDUCATION/TRAINING PROGRAM

## 2024-07-24 PROCEDURE — 82570 ASSAY OF URINE CREATININE: CPT | Performed by: STUDENT IN AN ORGANIZED HEALTH CARE EDUCATION/TRAINING PROGRAM

## 2024-07-24 PROCEDURE — 36415 COLL VENOUS BLD VENIPUNCTURE: CPT

## 2024-07-24 PROCEDURE — 84075 ASSAY ALKALINE PHOSPHATASE: CPT | Performed by: STUDENT IN AN ORGANIZED HEALTH CARE EDUCATION/TRAINING PROGRAM

## 2024-07-24 PROCEDURE — 99214 OFFICE O/P EST MOD 30 MIN: CPT | Performed by: STUDENT IN AN ORGANIZED HEALTH CARE EDUCATION/TRAINING PROGRAM

## 2024-07-24 PROCEDURE — 36415 COLL VENOUS BLD VENIPUNCTURE: CPT | Performed by: STUDENT IN AN ORGANIZED HEALTH CARE EDUCATION/TRAINING PROGRAM

## 2024-07-24 RX ORDER — NIFEDIPINE 10 MG/1
10 CAPSULE ORAL ONCE AS NEEDED
Status: DISCONTINUED | OUTPATIENT
Start: 2024-07-24 | End: 2024-07-24 | Stop reason: HOSPADM

## 2024-07-24 RX ORDER — LIDOCAINE HYDROCHLORIDE 10 MG/ML
0.5 INJECTION INFILTRATION; PERINEURAL ONCE AS NEEDED
Status: DISCONTINUED | OUTPATIENT
Start: 2024-07-24 | End: 2024-07-24 | Stop reason: HOSPADM

## 2024-07-24 RX ORDER — ONDANSETRON HYDROCHLORIDE 2 MG/ML
4 INJECTION, SOLUTION INTRAVENOUS EVERY 6 HOURS PRN
Status: DISCONTINUED | OUTPATIENT
Start: 2024-07-24 | End: 2024-07-24 | Stop reason: HOSPADM

## 2024-07-24 RX ORDER — DIPHENHYDRAMINE HCL 25 MG
25 TABLET ORAL NIGHTLY PRN
Qty: 30 TABLET | Refills: 2 | Status: SHIPPED | OUTPATIENT
Start: 2024-07-24

## 2024-07-24 RX ORDER — ONDANSETRON 4 MG/1
4 TABLET, FILM COATED ORAL EVERY 6 HOURS PRN
Status: DISCONTINUED | OUTPATIENT
Start: 2024-07-24 | End: 2024-07-24 | Stop reason: HOSPADM

## 2024-07-24 RX ORDER — METRONIDAZOLE 500 MG/1
500 TABLET ORAL 2 TIMES DAILY
Qty: 14 TABLET | Refills: 0 | Status: SHIPPED | OUTPATIENT
Start: 2024-07-24 | End: 2024-07-31

## 2024-07-24 RX ORDER — HYDRALAZINE HYDROCHLORIDE 20 MG/ML
5 INJECTION INTRAMUSCULAR; INTRAVENOUS ONCE AS NEEDED
Status: DISCONTINUED | OUTPATIENT
Start: 2024-07-24 | End: 2024-07-24 | Stop reason: HOSPADM

## 2024-07-24 RX ORDER — LABETALOL HYDROCHLORIDE 5 MG/ML
20 INJECTION, SOLUTION INTRAVENOUS ONCE AS NEEDED
Status: DISCONTINUED | OUTPATIENT
Start: 2024-07-24 | End: 2024-07-24 | Stop reason: HOSPADM

## 2024-07-24 SDOH — HEALTH STABILITY: MENTAL HEALTH: WERE YOU ABLE TO COMPLETE ALL THE BEHAVIORAL HEALTH SCREENINGS?: YES

## 2024-07-24 SDOH — SOCIAL STABILITY: SOCIAL INSECURITY: DOES ANYONE TRY TO KEEP YOU FROM HAVING/CONTACTING OTHER FRIENDS OR DOING THINGS OUTSIDE YOUR HOME?: NO

## 2024-07-24 SDOH — ECONOMIC STABILITY: FOOD INSECURITY: WITHIN THE PAST 12 MONTHS, YOU WORRIED THAT YOUR FOOD WOULD RUN OUT BEFORE YOU GOT MONEY TO BUY MORE.: SOMETIMES TRUE

## 2024-07-24 SDOH — SOCIAL STABILITY: SOCIAL INSECURITY: PHYSICAL ABUSE: DENIES

## 2024-07-24 SDOH — SOCIAL STABILITY: SOCIAL INSECURITY: HAVE YOU HAD ANY THOUGHTS OF HARMING ANYONE ELSE?: NO

## 2024-07-24 SDOH — SOCIAL STABILITY: SOCIAL INSECURITY: ARE YOU OR HAVE YOU BEEN THREATENED OR ABUSED PHYSICALLY, EMOTIONALLY, OR SEXUALLY BY ANYONE?: NO

## 2024-07-24 SDOH — ECONOMIC STABILITY: FOOD INSECURITY: WITHIN THE PAST 12 MONTHS, THE FOOD YOU BOUGHT JUST DIDN'T LAST AND YOU DIDN'T HAVE MONEY TO GET MORE.: OFTEN TRUE

## 2024-07-24 SDOH — ECONOMIC STABILITY: HOUSING INSECURITY: DO YOU FEEL UNSAFE GOING BACK TO THE PLACE WHERE YOU ARE LIVING?: NO

## 2024-07-24 SDOH — HEALTH STABILITY: MENTAL HEALTH: SUICIDAL BEHAVIOR (LIFETIME): NO

## 2024-07-24 SDOH — HEALTH STABILITY: MENTAL HEALTH: HAVE YOU USED ANY SUBSTANCES (CANABIS, COCAINE, HEROIN, HALLUCINOGENS, INHALANTS, ETC.) IN THE PAST 12 MONTHS?: NO

## 2024-07-24 SDOH — HEALTH STABILITY: MENTAL HEALTH: NON-SPECIFIC ACTIVE SUICIDAL THOUGHTS (PAST 1 MONTH): NO

## 2024-07-24 SDOH — HEALTH STABILITY: MENTAL HEALTH: HAVE YOU USED ANY PRESCRIPTION DRUGS OTHER THAN PRESCRIBED IN THE PAST 12 MONTHS?: NO

## 2024-07-24 SDOH — SOCIAL STABILITY: SOCIAL INSECURITY: ABUSE SCREEN: ADULT

## 2024-07-24 SDOH — ECONOMIC STABILITY - FOOD INSECURITY: FOOD INSECURITY: Z59.41

## 2024-07-24 SDOH — SOCIAL STABILITY: SOCIAL INSECURITY: DO YOU FEEL ANYONE HAS EXPLOITED OR TAKEN ADVANTAGE OF YOU FINANCIALLY OR OF YOUR PERSONAL PROPERTY?: NO

## 2024-07-24 SDOH — SOCIAL STABILITY: SOCIAL INSECURITY: ARE THERE ANY APPARENT SIGNS OF INJURIES/BEHAVIORS THAT COULD BE RELATED TO ABUSE/NEGLECT?: NO

## 2024-07-24 SDOH — SOCIAL STABILITY: SOCIAL INSECURITY: HAVE YOU HAD THOUGHTS OF HARMING ANYONE ELSE?: NO

## 2024-07-24 SDOH — HEALTH STABILITY: MENTAL HEALTH: WISH TO BE DEAD (PAST 1 MONTH): NO

## 2024-07-24 SDOH — SOCIAL STABILITY: SOCIAL INSECURITY: VERBAL ABUSE: DENIES

## 2024-07-24 SDOH — SOCIAL STABILITY: SOCIAL INSECURITY: HAS ANYONE EVER THREATENED TO HURT YOUR FAMILY OR YOUR PETS?: NO

## 2024-07-24 ASSESSMENT — LIFESTYLE VARIABLES
HOW MANY STANDARD DRINKS CONTAINING ALCOHOL DO YOU HAVE ON A TYPICAL DAY: PATIENT DOES NOT DRINK
HOW OFTEN DO YOU HAVE A DRINK CONTAINING ALCOHOL: NEVER
SKIP TO QUESTIONS 9-10: 1
HOW OFTEN DO YOU HAVE 6 OR MORE DRINKS ON ONE OCCASION: NEVER
AUDIT-C TOTAL SCORE: 0
AUDIT-C TOTAL SCORE: 0

## 2024-07-24 ASSESSMENT — PAIN SCALES - GENERAL
PAINLEVEL_OUTOF10: 0 - NO PAIN

## 2024-07-24 NOTE — TELEPHONE ENCOUNTER
----- Message from Carol BURRELL sent at 2024  1:57 PM EDT -----  Regarding: RE: [facility] Pt  I will reach out to her.  Thank you!  ----- Message -----  From: Traci Thomas MD  Sent: 2024   1:10 PM EDT  To: SELINA Benitez; SELINA Melendez; #  Subject: RE: [facility] Pt                               Hi all, can we see if we can get in touch with Ms. Joy again? Even if she does not want addiction treatment, she needs the necessary appointments for her new dx of GHTN  ----- Message -----  From: CLIFF HaqueC  Sent: 2024   1:00 PM EDT  To: Traci Thomas MD  Subject: [facility] Pt                                   Hi Dr. Thomas,    I saw this pt in triage today (she was also here yesterday so not sure if you already received a message). She is a current substance user (smokes crack cocaine, last use 1 day ago). She declined MAT induction, resources, etc and was seen by  1 day ago. She is agreeable to referral to [facility] (looks like they tried to get her set up before) and now meets criteria for gHTN. I discussed with her twice weekly  testing and delivery at 37wga. Can you send this to the appropriate person to get her set up with [facility] and for a growth US? Her number is [phone].     Thanks!  Donna

## 2024-07-24 NOTE — TELEPHONE ENCOUNTER
LAURIE KOROMA contacted pt to schedule her an appointment with Dr. Thomas.  FRANCI was able to schedule pt for next Tuesday.

## 2024-07-24 NOTE — H&P
Obstetrical Triage History and Physical     Leigh Ann Joy is a 28 y.o.  34w2d by 19w US who presents to triage for prolonged monitoring    Chief Complaint: fetal heart rate check and rash on arms    Assessment/Plan    gHTN  - newly diagnosed today by 2 mild range BPs > 4 hours apart  - asymptomatic  - BP cycled in triage and largely normotensive, isolated mild range   - HELLP labs negative and P:C 0.16  - will need twice weekly  testing and IOL at 37 weeks, discussed with pt   - BP cuff for home     Tachycardia  - EKG sinus tach  - denies CP, SOB   - declined IV fluids   - HR improved with PO fluids     Hx of DREW  - currently uses crack cocaine (smokes), last use 1 day ago  - declines resources for MAT, rehab, etc   - seen by SW 1 day ago   - agreeable to referral to [facility], message sent to Dr. Thomas     Rash  - 0.5cm discrete papular pruritic lesions on b/l extremities and abdomen, spares back, does not appear infected   - discussed c/f secondary syphilis rash and possible re-infection   - paged dermatology during triage visit yesterday and spoke with team today, they will schedule pt in acute clinic this week     Syphilis  - Dx Oct 2023 confirmed with TPPA, patient re-treated in 2024 given rash however RPR was neg at that time  - Per patient has received 6 doses of PCN total  - RPR has been negative this pregnancy   - 3rd trimester screening peformed, antibody +, RPR negative 2024     Hx Trichomonas  - Trich + in April, treated   - GOLD collected yesterday, results pending    IUP @ 34w2d  - prenatal lab work reviewed   - normal 1 hr GTT  - NST reactive    Maternal Well-being  - Emotional support and reassurance provided  - All questions and concerns addressed    Dispo:  - appropriate for discharge to home, patient comfortable with this   - follow-up within 1 week with [facility]  - return precautions reviewed     Plan and tracing discussed with Dr. Khalil who reviewed tracing and  agrees with DC home.    Deborah Pat PA-C  24 1:07 PM  Vocera    Active Problems:    Gestational hypertension, third trimester ([facility])    Pregnancy Problems (from 24 to present)       Problem Noted Resolved    Gestational hypertension, third trimester ([facility]) 2024 by Deborah Pat PA-C No    Priority:  Medium      Overview Signed 2024  1:06 PM by Deborah Pat PA-C     Diagnosed by mild range BP x 2 in triage   HELLP labs negative, P:C 0.16  Needs twice weekly  testing and 37 wga IOL          Substance abuse (Multi) 2024 by Sara Kapadia, JD-CNM No    Priority:  Medium      Overview Addendum 2024  1:04 PM by Deborah Pat PA-C     Enrolled in [facility] program  Was previously living in [facility]  Drugs of choice: marijuana and crack cocaine  Last use 24 (smoked crack cocaine)           History of syphilis 2024 by Nikki Reece, APRN-CNP No    Priority:  Medium      Overview Addendum 2024  1:04 PM by Deborah Pat PA-C     10/30/23: No titer drawn when treated for syphilis 10/30/23- Dx by reactive RPR and TPPA. Last nonreactive RPR testing 10/23/21, > 1 yr from positive testing. Patient treated for early latent/primary/secondary syphilis with PCN x1.   3/18/24: RPR nonreactive. Treated with PCN x1 again in ED for rash.  24: RPR nonreactive.  24: RPR nonreactive.   *Consider treatment for late latent syphilis given unknown duration of infection.         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 She has no complaints at this time. Denies HA, SOB, CP, RUQ pain, or vision changes. Reports good fetal movement. Denies leaking of fluids, vaginal bleeding and contractions. Currently using crack cocaine (smokes) and reports last use last night after leaving triage.       Obstetrical History   OB History    Para Term  AB Living   8 4 4 0 3 4   SAB IAB Ectopic Multiple Live Births   2 1     4      # Outcome Date GA Lbr Carlos/2nd Weight Sex Type Anes PTL Lv   8 Current            7 Term 10/23/21    F    ANDERS   6 Term 11/10/19    F Vag-Spont   ANDERS   5 Term 16   3.685 kg M Vag-Spont   ANDERS   4 Term 05/13/15   3.657 kg M Vag-Spont   ANDERS   3 SAB            2 SAB            1 IAB                Past Medical History  Past Medical History:   Diagnosis Date    Supervision of pregnancy with grand multiparity, third trimester ([facility]) 2019    High risk multigravida in third trimester    Supervision of pregnancy with history of pre-term labor, unspecified trimester ([facility]) 2014    Previous  delivery, antepartum        Past Surgical History   Past Surgical History:   Procedure Laterality Date    INDUCED       OTHER SURGICAL HISTORY  2014    Reported Prior Surgical / Procedural History       Social History  Social History     Tobacco Use    Smoking status: Every Day     Current packs/day: 0.25     Types: Cigarettes    Smokeless tobacco: Never   Substance Use Topics    Alcohol use: Never     Substance and Sexual Activity   Drug Use Not Currently    Types: \"Crack\" cocaine, Marijuana    Comment: 1 month sober       Allergies  Penicillins     Medications  No medications prior to admission.       Objective    Last Vitals  Temp Pulse Resp BP MAP O2 Sat   36 °C (96.8 °F) 102 18 136/74 95 99 %     Physical Examination  GENERAL: Examination reveals a well developed, well nourished, gravid female in no acute distress. She is alert and cooperative.  LUNGS: normal respiratory " effort  ABDOMEN: soft, gravid, nontender, nondistended, no abnormal masses, no epigastric pain  SKIN: 0.5cm discrete papular pruritic lesions on b/l extremities and abdomen, spares back, does not appear infected   NEUROLOGICAL: alert, oriented, normal speech, no focal findings or movement disorder noted  PSYCHOLOGICAL: awake and alert; oriented to person, place, and time    Non-Stress Test   Baseline Fetal Heart Rate for Non-Stress Test: 130 BPM  Variability in Waveform for Non-Stress Test: Moderate  Accelerations in Non-Stress Test: Yes, lasting at least 15 seconds, greater than/equal to 15 bpm  Decelerations in Non-Stress Test: None  Contractions in Non-Stress Test: Not present  Interpretation of Non-Stress Test   Interpretation of Non-Stress Test: Reactive    Lab Review  Lab Results   Component Value Date    WBC 8.7 07/24/2024    HGB 12.2 07/24/2024    HCT 37.5 07/24/2024     07/24/2024     Lab Results   Component Value Date    GLUCOSE 135 (H) 07/24/2024     07/24/2024    K 3.6 07/24/2024     07/24/2024    CO2 18 (L) 07/24/2024    ANIONGAP 15 07/24/2024    BUN 8 07/24/2024    CREATININE 0.51 07/24/2024    EGFR >90 07/24/2024    CALCIUM 8.3 (L) 07/24/2024    ALBUMIN 3.2 (L) 07/24/2024    PROT 6.1 (L) 07/24/2024    ALKPHOS 111 (H) 07/24/2024    ALT 31 07/24/2024    AST 23 07/24/2024    BILITOT 0.4 07/24/2024     0   Lab Value Date/Time    UTPCR 0.16 07/24/2024 1150    UTPCR 0.15 07/23/2024 1717

## 2024-07-24 NOTE — TELEPHONE ENCOUNTER
Patient notified of positive trichomonas testing. Treatment sent to preferred pharmacy. [facility]. Abstinence instructions reviewed, patient verbalizes understanding.     Patient requested 's phone number- social work referral placed for assistance with transportation.  Also expressed food insecurity- referral to food for [facility] placed.

## 2024-07-25 ENCOUNTER — PHARMACY VISIT (OUTPATIENT)
Dept: PHARMACY | Facility: CLINIC | Age: 29
End: 2024-07-25
Payer: MEDICAID

## 2024-07-25 ENCOUNTER — OFFICE VISIT (OUTPATIENT)
Dept: DERMATOLOGY | Facility: CLINIC | Age: 29
End: 2024-07-25
Payer: MEDICAID

## 2024-07-25 DIAGNOSIS — L30.9 DERMATITIS: Primary | ICD-10-CM

## 2024-07-25 LAB — BACTERIA UR CULT: ABNORMAL

## 2024-07-25 PROCEDURE — RXMED WILLOW AMBULATORY MEDICATION CHARGE

## 2024-07-25 PROCEDURE — 99204 OFFICE O/P NEW MOD 45 MIN: CPT | Performed by: STUDENT IN AN ORGANIZED HEALTH CARE EDUCATION/TRAINING PROGRAM

## 2024-07-25 RX ORDER — CLOBETASOL PROPIONATE 0.5 MG/G
CREAM TOPICAL 2 TIMES DAILY
Qty: 45 G | Refills: 1 | Status: SHIPPED | OUTPATIENT
Start: 2024-07-25

## 2024-07-25 NOTE — PROGRESS NOTES
"Subjective     Leigh Ann Joy is a 28 y.o. female who presents for the following: Rash (Rash to bilateral arms, legs. Itchy, puss at times. Using hydrocortisone cream, helps with itch but not clearing of rash. ).     Patient was seen in OBGYN and was referred here for possible secondary syphilis. Patient states that she does pick at her rash. Patient thought they were bed bug bites that started 7 months ago. Has spread to the abdomen. Has only been using hydrocortisone.     Review of Systems:  No other skin or systemic complaints other than what is documented elsewhere in the note.    The following portions of the chart were reviewed this encounter and updated as appropriate:       Skin Cancer History  No skin cancer on file.    Specialty Problems    None    Past Medical History:  Leigh Ann Joy  has a past medical history of Supervision of pregnancy with grand multiparity, third trimester ([facility]) (2019) and Supervision of pregnancy with history of pre-term labor, unspecified trimester ([facility]) (2014).    Past Surgical History:  Leigh Ann Joy  has a past surgical history that includes Other surgical history (2014) and Induced .    Family History:  Patient family history includes Epilepsy in her father.    Social History:  Leigh Ann Joy  reports that she has been smoking cigarettes. She has never used smokeless tobacco. She reports that she does not currently use drugs after having used the following drugs: \"Crack\" cocaine and Marijuana. She reports that she does not drink alcohol.    Allergies:  Penicillins    Current Medications / CAM's:    Current Outpatient Medications:     clobetasol (Temovate) 0.05 % cream, Apply topically 2 times a day. To arms for 2 weeks. Avoid on face, skin folds, genitals., Disp: 45 g, Rfl: 1    diphenhydrAMINE (Sominex) 25 mg tablet, Take 1 tablet (25 mg) by mouth as needed at bedtime for sleep., Disp: 30 tablet, Rfl: 2    metroNIDAZOLE (Flagyl) 500 mg " tablet, Take 1 tablet (500 mg) by mouth 2 times a day for 7 days., Disp: 14 tablet, Rfl: 0    prenatal vitamin, iron-folic, 27 mg iron-800 mcg folic acid tablet, Take 1 tablet by mouth once daily., Disp: 30 tablet, Rfl: 0     Objective   Well appearing patient in no apparent distress; mood and affect are within normal limits.    A waist-up examination was performed including scalp, face, eyes, ears, nose, lips, neck, chest, axillae, abdomen, back, and bilateral upper extremities. All findings within normal limits unless otherwise noted below.    Assessment/Plan   1. Dermatitis  Arms  Multiple scattered erythematous papulonodules with hemorrhagic crusting    Favoring prurigo of pregnancy, patient is 7 months pregnant  - Start clobetasol 0.05% cream twice a day to the affected areas of the bilateral forearms for 2 weeks  - Start OTC Cerave Anti-itch pramoxine lotion as much as possible to help relieve itch  - Educated patient to attempt to stop scratching and picking at her lesions  - Discussed that her rash may improve following the birth of her child  - Follow-up as needed or worsening    Related Medications  clobetasol (Temovate) 0.05 % cream  Apply topically 2 times a day. To arms for 2 weeks. Avoid on face, skin folds, genitals.      Alphonse Munoz DO  PGY-4 Dermatology    I saw and evaluated the patient. I personally obtained the key and critical portions of the history and physical exam or was physically present for key and critical portions performed by the resident. I reviewed the resident's documentation and discussed the patient with the resident. I agree with the resident's medical decision making as documented in the note.    Yaa Benitez MD

## 2024-07-29 ENCOUNTER — TELEPHONE (OUTPATIENT)
Dept: PEDIATRICS | Facility: CLINIC | Age: 29
End: 2024-07-29
Payer: MEDICAID

## 2024-07-31 ENCOUNTER — APPOINTMENT (OUTPATIENT)
Dept: NUTRITION | Facility: HOSPITAL | Age: 29
End: 2024-07-31
Payer: MEDICAID

## 2024-08-02 ENCOUNTER — CLINICAL SUPPORT (OUTPATIENT)
Dept: NUTRITION | Facility: HOSPITAL | Age: 29
End: 2024-08-02
Payer: MEDICAID

## 2024-08-02 DIAGNOSIS — Z59.41 FOOD INSECURITY: ICD-10-CM

## 2024-08-02 DIAGNOSIS — Z3A.34 34 WEEKS GESTATION OF PREGNANCY (HHS-HCC): ICD-10-CM

## 2024-08-02 SDOH — ECONOMIC STABILITY - FOOD INSECURITY: FOOD INSECURITY: Z59.41

## 2024-08-02 NOTE — PROGRESS NOTES
[employer]  Diet Recommendation 1: Pregnancy and Breastfeeding  Diet Recommendation 2: Sodium, Low  Diet Recommendation 3: Healthy Eating  Food Intolerance Avoidance: NKFA. (Partner does not eat pork)  Household Size: 2 Family Members  Interventions: Referral Number: 1st 6 Mo Referral 6 Mos  Interventions: Visit Number: 1 of 6 Visits - Max 6 Visits/Referral Each 6 Mo Period  Education Today: [employer]  Follow Up Notes for Future Visits: Pt says pregnancy going well so far. SRI early Sept (5th child), but being induced Aug 15th 2/2 blood pressure. She and partner both cook. Discussed limiting salt. Not on WIC, but considering pumping. Gave WIC #s and encouraged calling insurance for pump.  Grains: 50-75% Whole  Fruit: 50-75% Fresh  Vegetables: 50-75% Fresh  Proteins: 3 Plant-based Items  Dairy: 25-50% Lowfat  Originating Site of Referral Order: [facility]  Initials of RD Assisting Today: GIULIANA

## 2024-08-14 DIAGNOSIS — O13.3 GESTATIONAL HYPERTENSION, THIRD TRIMESTER (HHS-HCC): ICD-10-CM

## 2024-08-14 DIAGNOSIS — O09.93 HIGH-RISK PREGNANCY IN THIRD TRIMESTER (HHS-HCC): Primary | ICD-10-CM

## 2024-08-14 DIAGNOSIS — F19.10 SUBSTANCE ABUSE (MULTI): ICD-10-CM

## 2024-08-14 PROBLEM — J39.8 CONGESTION OF UPPER RESPIRATORY TRACT: Status: RESOLVED | Noted: 2023-11-03 | Resolved: 2024-08-14

## 2024-08-14 PROBLEM — O99.210 OBESITY IN PREGNANCY (HHS-HCC): Status: RESOLVED | Noted: 2023-11-03 | Resolved: 2024-08-14

## 2024-08-14 PROBLEM — O09.30 LATE PRENATAL CARE (HHS-HCC): Status: RESOLVED | Noted: 2023-11-03 | Resolved: 2024-08-14

## 2024-08-14 PROBLEM — N89.8 VAGINAL ITCHING: Status: RESOLVED | Noted: 2024-04-12 | Resolved: 2024-08-14

## 2024-08-14 PROBLEM — Z32.01 PREGNANCY TEST POSITIVE (HHS-HCC): Status: RESOLVED | Noted: 2024-04-12 | Resolved: 2024-08-14

## 2024-08-14 PROBLEM — Z12.4 CERVICAL CANCER SCREENING: Status: RESOLVED | Noted: 2024-04-12 | Resolved: 2024-08-14

## 2024-08-15 ENCOUNTER — ANESTHESIA EVENT (OUTPATIENT)
Dept: OBSTETRICS AND GYNECOLOGY | Facility: HOSPITAL | Age: 29
End: 2024-08-15
Payer: MEDICAID

## 2024-08-15 ENCOUNTER — APPOINTMENT (OUTPATIENT)
Dept: OBSTETRICS AND GYNECOLOGY | Facility: HOSPITAL | Age: 29
End: 2024-08-15
Payer: MEDICAID

## 2024-08-15 ENCOUNTER — HOSPITAL ENCOUNTER (INPATIENT)
Facility: HOSPITAL | Age: 29
LOS: 4 days | Discharge: HOME | End: 2024-08-19
Attending: OBSTETRICS & GYNECOLOGY | Admitting: OBSTETRICS & GYNECOLOGY
Payer: MEDICAID

## 2024-08-15 ENCOUNTER — ANESTHESIA (OUTPATIENT)
Dept: OBSTETRICS AND GYNECOLOGY | Facility: HOSPITAL | Age: 29
End: 2024-08-15
Payer: MEDICAID

## 2024-08-15 DIAGNOSIS — F19.10 SUBSTANCE ABUSE (MULTI): ICD-10-CM

## 2024-08-15 DIAGNOSIS — O13.3 GESTATIONAL HYPERTENSION, THIRD TRIMESTER (HHS-HCC): ICD-10-CM

## 2024-08-15 DIAGNOSIS — O09.93 HIGH-RISK PREGNANCY IN THIRD TRIMESTER (HHS-HCC): ICD-10-CM

## 2024-08-15 DIAGNOSIS — G47.09 OTHER INSOMNIA: ICD-10-CM

## 2024-08-15 PROBLEM — R21 RASH: Status: ACTIVE | Noted: 2024-08-15

## 2024-08-15 LAB
ABO GROUP (TYPE) IN BLOOD: NORMAL
AMPHETAMINES UR QL SCN: ABNORMAL
ANTIBODY SCREEN: NORMAL
BARBITURATES UR QL SCN: ABNORMAL
BENZODIAZ UR QL SCN: ABNORMAL
BZE UR QL SCN: ABNORMAL
CANNABINOIDS UR QL SCN: ABNORMAL
ERYTHROCYTE [DISTWIDTH] IN BLOOD BY AUTOMATED COUNT: 13.9 % (ref 11.5–14.5)
FENTANYL+NORFENTANYL UR QL SCN: ABNORMAL
HCT VFR BLD AUTO: 38.6 % (ref 36–46)
HGB BLD-MCNC: 12.5 G/DL (ref 12–16)
MCH RBC QN AUTO: 29.1 PG (ref 26–34)
MCHC RBC AUTO-ENTMCNC: 32.4 G/DL (ref 32–36)
MCV RBC AUTO: 90 FL (ref 80–100)
METHADONE UR QL SCN: ABNORMAL
NRBC BLD-RTO: 0 /100 WBCS (ref 0–0)
OPIATES UR QL SCN: ABNORMAL
OXYCODONE+OXYMORPHONE UR QL SCN: ABNORMAL
PCP UR QL SCN: ABNORMAL
PLATELET # BLD AUTO: 306 X10*3/UL (ref 150–450)
RBC # BLD AUTO: 4.29 X10*6/UL (ref 4–5.2)
RH FACTOR (ANTIGEN D): NORMAL
WBC # BLD AUTO: 10.5 X10*3/UL (ref 4.4–11.3)

## 2024-08-15 PROCEDURE — 1220000001 HC OB SEMI-PRIVATE ROOM DAILY

## 2024-08-15 PROCEDURE — 86780 TREPONEMA PALLIDUM: CPT

## 2024-08-15 PROCEDURE — 85027 COMPLETE CBC AUTOMATED: CPT

## 2024-08-15 PROCEDURE — 80053 COMPREHEN METABOLIC PANEL: CPT

## 2024-08-15 PROCEDURE — 86803 HEPATITIS C AB TEST: CPT

## 2024-08-15 PROCEDURE — 87340 HEPATITIS B SURFACE AG IA: CPT

## 2024-08-15 PROCEDURE — 87389 HIV-1 AG W/HIV-1&-2 AB AG IA: CPT

## 2024-08-15 PROCEDURE — 36415 COLL VENOUS BLD VENIPUNCTURE: CPT

## 2024-08-15 PROCEDURE — 3E033VJ INTRODUCTION OF OTHER HORMONE INTO PERIPHERAL VEIN, PERCUTANEOUS APPROACH: ICD-10-PCS | Performed by: OBSTETRICS & GYNECOLOGY

## 2024-08-15 PROCEDURE — 7210000002 HC LABOR PER HOUR

## 2024-08-15 PROCEDURE — 86850 RBC ANTIBODY SCREEN: CPT

## 2024-08-15 PROCEDURE — 59050 FETAL MONITOR W/REPORT: CPT

## 2024-08-15 PROCEDURE — 87081 CULTURE SCREEN ONLY: CPT

## 2024-08-15 PROCEDURE — 86318 IA INFECTIOUS AGENT ANTIBODY: CPT

## 2024-08-15 PROCEDURE — 2500000004 HC RX 250 GENERAL PHARMACY W/ HCPCS (ALT 636 FOR OP/ED)

## 2024-08-15 PROCEDURE — 10907ZC DRAINAGE OF AMNIOTIC FLUID, THERAPEUTIC FROM PRODUCTS OF CONCEPTION, VIA NATURAL OR ARTIFICIAL OPENING: ICD-10-PCS | Performed by: OBSTETRICS & GYNECOLOGY

## 2024-08-15 PROCEDURE — 80307 DRUG TEST PRSMV CHEM ANLYZR: CPT | Performed by: STUDENT IN AN ORGANIZED HEALTH CARE EDUCATION/TRAINING PROGRAM

## 2024-08-15 RX ORDER — METOCLOPRAMIDE 10 MG/1
10 TABLET ORAL EVERY 6 HOURS PRN
Status: DISCONTINUED | OUTPATIENT
Start: 2024-08-15 | End: 2024-08-16

## 2024-08-15 RX ORDER — OXYTOCIN/0.9 % SODIUM CHLORIDE 30/500 ML
60 PLASTIC BAG, INJECTION (ML) INTRAVENOUS ONCE AS NEEDED
Status: DISCONTINUED | OUTPATIENT
Start: 2024-08-15 | End: 2024-08-16

## 2024-08-15 RX ORDER — SODIUM CHLORIDE, SODIUM LACTATE, POTASSIUM CHLORIDE, CALCIUM CHLORIDE 600; 310; 30; 20 MG/100ML; MG/100ML; MG/100ML; MG/100ML
125 INJECTION, SOLUTION INTRAVENOUS CONTINUOUS
Status: DISCONTINUED | OUTPATIENT
Start: 2024-08-15 | End: 2024-08-16

## 2024-08-15 RX ORDER — OXYTOCIN 10 [USP'U]/ML
10 INJECTION, SOLUTION INTRAMUSCULAR; INTRAVENOUS ONCE AS NEEDED
Status: DISCONTINUED | OUTPATIENT
Start: 2024-08-15 | End: 2024-08-16

## 2024-08-15 RX ORDER — OXYTOCIN/0.9 % SODIUM CHLORIDE 30/500 ML
60 PLASTIC BAG, INJECTION (ML) INTRAVENOUS CONTINUOUS
Status: DISCONTINUED | OUTPATIENT
Start: 2024-08-15 | End: 2024-08-16

## 2024-08-15 RX ORDER — ONDANSETRON 4 MG/1
4 TABLET, FILM COATED ORAL EVERY 6 HOURS PRN
Status: DISCONTINUED | OUTPATIENT
Start: 2024-08-15 | End: 2024-08-16

## 2024-08-15 RX ORDER — HALOPERIDOL 5 MG/ML
5 INJECTION INTRAMUSCULAR EVERY 6 HOURS PRN
Status: DISCONTINUED | OUTPATIENT
Start: 2024-08-15 | End: 2024-08-15

## 2024-08-15 RX ORDER — SODIUM CHLORIDE, SODIUM LACTATE, POTASSIUM CHLORIDE, CALCIUM CHLORIDE 600; 310; 30; 20 MG/100ML; MG/100ML; MG/100ML; MG/100ML
125 INJECTION, SOLUTION INTRAVENOUS CONTINUOUS
Status: DISCONTINUED | OUTPATIENT
Start: 2024-08-15 | End: 2024-08-15

## 2024-08-15 RX ORDER — TRANEXAMIC ACID 100 MG/ML
1000 INJECTION, SOLUTION INTRAVENOUS ONCE AS NEEDED
Status: DISCONTINUED | OUTPATIENT
Start: 2024-08-15 | End: 2024-08-16

## 2024-08-15 RX ORDER — ONDANSETRON HYDROCHLORIDE 2 MG/ML
4 INJECTION, SOLUTION INTRAVENOUS EVERY 6 HOURS PRN
Status: DISCONTINUED | OUTPATIENT
Start: 2024-08-15 | End: 2024-08-16

## 2024-08-15 RX ORDER — METHYLERGONOVINE MALEATE 0.2 MG/ML
0.2 INJECTION INTRAVENOUS ONCE AS NEEDED
Status: DISCONTINUED | OUTPATIENT
Start: 2024-08-15 | End: 2024-08-16

## 2024-08-15 RX ORDER — OXYTOCIN/0.9 % SODIUM CHLORIDE 30/500 ML
2-30 PLASTIC BAG, INJECTION (ML) INTRAVENOUS CONTINUOUS
Status: DISCONTINUED | OUTPATIENT
Start: 2024-08-15 | End: 2024-08-16

## 2024-08-15 RX ORDER — OXYTOCIN 10 [USP'U]/ML
10 INJECTION, SOLUTION INTRAMUSCULAR; INTRAVENOUS ONCE AS NEEDED
Status: DISCONTINUED | OUTPATIENT
Start: 2024-08-15 | End: 2024-08-15

## 2024-08-15 RX ORDER — MISOPROSTOL 200 UG/1
800 TABLET ORAL ONCE AS NEEDED
Status: DISCONTINUED | OUTPATIENT
Start: 2024-08-15 | End: 2024-08-16

## 2024-08-15 RX ORDER — NIFEDIPINE 10 MG/1
10 CAPSULE ORAL ONCE AS NEEDED
Status: DISCONTINUED | OUTPATIENT
Start: 2024-08-15 | End: 2024-08-16

## 2024-08-15 RX ORDER — TERBUTALINE SULFATE 1 MG/ML
0.25 INJECTION SUBCUTANEOUS ONCE AS NEEDED
Status: DISCONTINUED | OUTPATIENT
Start: 2024-08-15 | End: 2024-08-16

## 2024-08-15 RX ORDER — OXYTOCIN/0.9 % SODIUM CHLORIDE 30/500 ML
60 PLASTIC BAG, INJECTION (ML) INTRAVENOUS ONCE AS NEEDED
Status: DISCONTINUED | OUTPATIENT
Start: 2024-08-15 | End: 2024-08-15

## 2024-08-15 RX ORDER — HALOPERIDOL 5 MG/ML
5 INJECTION INTRAMUSCULAR EVERY 6 HOURS PRN
Status: DISCONTINUED | OUTPATIENT
Start: 2024-08-15 | End: 2024-08-16

## 2024-08-15 RX ORDER — METOCLOPRAMIDE HYDROCHLORIDE 5 MG/ML
10 INJECTION INTRAMUSCULAR; INTRAVENOUS EVERY 6 HOURS PRN
Status: DISCONTINUED | OUTPATIENT
Start: 2024-08-15 | End: 2024-08-16

## 2024-08-15 RX ORDER — HYDRALAZINE HYDROCHLORIDE 20 MG/ML
5 INJECTION INTRAMUSCULAR; INTRAVENOUS ONCE AS NEEDED
Status: DISCONTINUED | OUTPATIENT
Start: 2024-08-15 | End: 2024-08-16

## 2024-08-15 RX ORDER — CARBOPROST TROMETHAMINE 250 UG/ML
250 INJECTION, SOLUTION INTRAMUSCULAR ONCE AS NEEDED
Status: DISCONTINUED | OUTPATIENT
Start: 2024-08-15 | End: 2024-08-16

## 2024-08-15 RX ORDER — LOPERAMIDE HYDROCHLORIDE 2 MG/1
4 CAPSULE ORAL EVERY 2 HOUR PRN
Status: DISCONTINUED | OUTPATIENT
Start: 2024-08-15 | End: 2024-08-16

## 2024-08-15 RX ORDER — LIDOCAINE HYDROCHLORIDE 10 MG/ML
30 INJECTION INFILTRATION; PERINEURAL ONCE AS NEEDED
Status: DISCONTINUED | OUTPATIENT
Start: 2024-08-15 | End: 2024-08-16

## 2024-08-15 RX ORDER — LABETALOL HYDROCHLORIDE 5 MG/ML
20 INJECTION, SOLUTION INTRAVENOUS ONCE AS NEEDED
Status: DISCONTINUED | OUTPATIENT
Start: 2024-08-15 | End: 2024-08-16

## 2024-08-15 SDOH — SOCIAL STABILITY: SOCIAL INSECURITY: PHYSICAL ABUSE: DENIES

## 2024-08-15 SDOH — HEALTH STABILITY: MENTAL HEALTH: SUICIDAL BEHAVIOR (LIFETIME): NO

## 2024-08-15 SDOH — SOCIAL STABILITY: SOCIAL INSECURITY: VERBAL ABUSE: DENIES

## 2024-08-15 SDOH — HEALTH STABILITY: MENTAL HEALTH: WISH TO BE DEAD (PAST 1 MONTH): NO

## 2024-08-15 SDOH — HEALTH STABILITY: MENTAL HEALTH: WERE YOU ABLE TO COMPLETE ALL THE BEHAVIORAL HEALTH SCREENINGS?: YES

## 2024-08-15 SDOH — SOCIAL STABILITY: SOCIAL INSECURITY: ARE THERE ANY APPARENT SIGNS OF INJURIES/BEHAVIORS THAT COULD BE RELATED TO ABUSE/NEGLECT?: NO

## 2024-08-15 SDOH — SOCIAL STABILITY: SOCIAL INSECURITY: ABUSE SCREEN: ADULT

## 2024-08-15 SDOH — SOCIAL STABILITY: SOCIAL INSECURITY: ARE YOU OR HAVE YOU BEEN THREATENED OR ABUSED PHYSICALLY, EMOTIONALLY, OR SEXUALLY BY ANYONE?: NO

## 2024-08-15 SDOH — SOCIAL STABILITY: SOCIAL INSECURITY: HAVE YOU HAD ANY THOUGHTS OF HARMING ANYONE ELSE?: NO

## 2024-08-15 SDOH — SOCIAL STABILITY: SOCIAL INSECURITY: DO YOU FEEL ANYONE HAS EXPLOITED OR TAKEN ADVANTAGE OF YOU FINANCIALLY OR OF YOUR PERSONAL PROPERTY?: NO

## 2024-08-15 SDOH — SOCIAL STABILITY: SOCIAL INSECURITY: HAVE YOU HAD THOUGHTS OF HARMING ANYONE ELSE?: NO

## 2024-08-15 SDOH — SOCIAL STABILITY: SOCIAL INSECURITY: HAS ANYONE EVER THREATENED TO HURT YOUR FAMILY OR YOUR PETS?: NO

## 2024-08-15 SDOH — HEALTH STABILITY: MENTAL HEALTH: NON-SPECIFIC ACTIVE SUICIDAL THOUGHTS (PAST 1 MONTH): NO

## 2024-08-15 SDOH — SOCIAL STABILITY: SOCIAL INSECURITY: DOES ANYONE TRY TO KEEP YOU FROM HAVING/CONTACTING OTHER FRIENDS OR DOING THINGS OUTSIDE YOUR HOME?: NO

## 2024-08-15 SDOH — HEALTH STABILITY: MENTAL HEALTH: HAVE YOU USED ANY SUBSTANCES (CANABIS, COCAINE, HEROIN, HALLUCINOGENS, INHALANTS, ETC.) IN THE PAST 12 MONTHS?: YES

## 2024-08-15 SDOH — HEALTH STABILITY: MENTAL HEALTH: HAVE YOU USED ANY PRESCRIPTION DRUGS OTHER THAN PRESCRIBED IN THE PAST 12 MONTHS?: YES

## 2024-08-15 SDOH — ECONOMIC STABILITY: HOUSING INSECURITY: DO YOU FEEL UNSAFE GOING BACK TO THE PLACE WHERE YOU ARE LIVING?: NO

## 2024-08-15 ASSESSMENT — LIFESTYLE VARIABLES
HOW OFTEN DO YOU HAVE A DRINK CONTAINING ALCOHOL: NEVER
AUDIT-C TOTAL SCORE: 0
TOTAL_SCORE: 2
TOTAL_SCORE: 4
HOW OFTEN DO YOU HAVE 6 OR MORE DRINKS ON ONE OCCASION: NEVER
SKIP TO QUESTIONS 9-10: 1
AUDIT-C TOTAL SCORE: 0
TOTAL_SCORE: 19
TOTAL_SCORE: 5
HOW MANY STANDARD DRINKS CONTAINING ALCOHOL DO YOU HAVE ON A TYPICAL DAY: PATIENT DOES NOT DRINK

## 2024-08-15 ASSESSMENT — PATIENT HEALTH QUESTIONNAIRE - PHQ9
2. FEELING DOWN, DEPRESSED OR HOPELESS: NOT AT ALL
SUM OF ALL RESPONSES TO PHQ9 QUESTIONS 1 & 2: 0
1. LITTLE INTEREST OR PLEASURE IN DOING THINGS: NOT AT ALL

## 2024-08-15 ASSESSMENT — PAIN SCALES - GENERAL
PAINLEVEL_OUTOF10: 0 - NO PAIN

## 2024-08-16 ENCOUNTER — ANESTHESIA (OUTPATIENT)
Dept: OBSTETRICS AND GYNECOLOGY | Facility: HOSPITAL | Age: 29
End: 2024-08-16
Payer: MEDICAID

## 2024-08-16 ENCOUNTER — ANESTHESIA EVENT (OUTPATIENT)
Dept: OBSTETRICS AND GYNECOLOGY | Facility: HOSPITAL | Age: 29
End: 2024-08-16
Payer: MEDICAID

## 2024-08-16 LAB
ALBUMIN SERPL BCP-MCNC: 3.3 G/DL (ref 3.4–5)
ALP SERPL-CCNC: 144 U/L (ref 33–110)
ALT SERPL W P-5'-P-CCNC: 23 U/L (ref 7–45)
ANION GAP SERPL CALC-SCNC: 17 MMOL/L (ref 10–20)
AST SERPL W P-5'-P-CCNC: 19 U/L (ref 9–39)
BILIRUB SERPL-MCNC: 0.4 MG/DL (ref 0–1.2)
BUN SERPL-MCNC: 11 MG/DL (ref 6–23)
C TRACH RRNA SPEC QL NAA+PROBE: NEGATIVE
CALCIUM SERPL-MCNC: 8.8 MG/DL (ref 8.6–10.6)
CHLORIDE SERPL-SCNC: 106 MMOL/L (ref 98–107)
CO2 SERPL-SCNC: 19 MMOL/L (ref 21–32)
CREAT SERPL-MCNC: 0.54 MG/DL (ref 0.5–1.05)
EGFRCR SERPLBLD CKD-EPI 2021: >90 ML/MIN/1.73M*2
GLUCOSE SERPL-MCNC: 88 MG/DL (ref 74–99)
HBV SURFACE AG SERPL QL IA: NONREACTIVE
HCV AB SER QL: NONREACTIVE
HIV 1+2 AB+HIV1 P24 AG SERPL QL IA: NONREACTIVE
N GONORRHOEA DNA SPEC QL PROBE+SIG AMP: NEGATIVE
POTASSIUM SERPL-SCNC: 4.3 MMOL/L (ref 3.5–5.3)
PROT SERPL-MCNC: 6.5 G/DL (ref 6.4–8.2)
RPR SER QL: NONREACTIVE
SODIUM SERPL-SCNC: 138 MMOL/L (ref 136–145)
T PALLIDUM AB SER QL AGGL: REACTIVE
T VAGINALIS RRNA SPEC QL NAA+PROBE: NEGATIVE
TREPONEMA PALLIDUM IGG+IGM AB [PRESENCE] IN SERUM OR PLASMA BY IMMUNOASSAY: REACTIVE

## 2024-08-16 PROCEDURE — 87661 TRICHOMONAS VAGINALIS AMPLIF: CPT

## 2024-08-16 PROCEDURE — 2500000005 HC RX 250 GENERAL PHARMACY W/O HCPCS

## 2024-08-16 PROCEDURE — 88307 TISSUE EXAM BY PATHOLOGIST: CPT | Mod: TC,SUR | Performed by: SPECIALIST

## 2024-08-16 PROCEDURE — 58300 INSERT INTRAUTERINE DEVICE: CPT

## 2024-08-16 PROCEDURE — 2500000005 HC RX 250 GENERAL PHARMACY W/O HCPCS: Performed by: ANESTHESIOLOGY

## 2024-08-16 PROCEDURE — 1100000001 HC PRIVATE ROOM DAILY

## 2024-08-16 PROCEDURE — 7210000002 HC LABOR PER HOUR

## 2024-08-16 PROCEDURE — 59409 OBSTETRICAL CARE: CPT

## 2024-08-16 PROCEDURE — 2500000004 HC RX 250 GENERAL PHARMACY W/ HCPCS (ALT 636 FOR OP/ED): Performed by: STUDENT IN AN ORGANIZED HEALTH CARE EDUCATION/TRAINING PROGRAM

## 2024-08-16 PROCEDURE — 2500000001 HC RX 250 WO HCPCS SELF ADMINISTERED DRUGS (ALT 637 FOR MEDICARE OP): Performed by: STUDENT IN AN ORGANIZED HEALTH CARE EDUCATION/TRAINING PROGRAM

## 2024-08-16 PROCEDURE — 87491 CHLMYD TRACH DNA AMP PROBE: CPT

## 2024-08-16 PROCEDURE — 3700000014 EPIDURAL BLOCK: Mod: GC

## 2024-08-16 PROCEDURE — 2500000001 HC RX 250 WO HCPCS SELF ADMINISTERED DRUGS (ALT 637 FOR MEDICARE OP)

## 2024-08-16 PROCEDURE — 88307 TISSUE EXAM BY PATHOLOGIST: CPT | Performed by: STUDENT IN AN ORGANIZED HEALTH CARE EDUCATION/TRAINING PROGRAM

## 2024-08-16 PROCEDURE — 2720000007 HC OR 272 NO HCPCS

## 2024-08-16 PROCEDURE — 2500000004 HC RX 250 GENERAL PHARMACY W/ HCPCS (ALT 636 FOR OP/ED)

## 2024-08-16 PROCEDURE — 59409 OBSTETRICAL CARE: CPT | Performed by: SPECIALIST

## 2024-08-16 PROCEDURE — 4A1H7CZ MONITORING OF PRODUCTS OF CONCEPTION, CARDIAC RATE, VIA NATURAL OR ARTIFICIAL OPENING: ICD-10-PCS | Performed by: OBSTETRICS & GYNECOLOGY

## 2024-08-16 PROCEDURE — 51702 INSERT TEMP BLADDER CATH: CPT

## 2024-08-16 PROCEDURE — 10H07YZ INSERTION OF OTHER DEVICE INTO PRODUCTS OF CONCEPTION, VIA NATURAL OR ARTIFICIAL OPENING: ICD-10-PCS | Performed by: OBSTETRICS & GYNECOLOGY

## 2024-08-16 PROCEDURE — 3E0E77Z INTRODUCTION OF ELECTROLYTIC AND WATER BALANCE SUBSTANCE INTO PRODUCTS OF CONCEPTION, VIA NATURAL OR ARTIFICIAL OPENING: ICD-10-PCS | Performed by: OBSTETRICS & GYNECOLOGY

## 2024-08-16 PROCEDURE — 7100000016 HC LABOR RECOVERY PER HOUR

## 2024-08-16 PROCEDURE — 0UH97HZ INSERTION OF CONTRACEPTIVE DEVICE INTO UTERUS, VIA NATURAL OR ARTIFICIAL OPENING: ICD-10-PCS | Performed by: SPECIALIST

## 2024-08-16 RX ORDER — OXYTOCIN/0.9 % SODIUM CHLORIDE 30/500 ML
60 PLASTIC BAG, INJECTION (ML) INTRAVENOUS ONCE AS NEEDED
Status: DISCONTINUED | OUTPATIENT
Start: 2024-08-16 | End: 2024-08-19 | Stop reason: HOSPADM

## 2024-08-16 RX ORDER — LABETALOL HYDROCHLORIDE 5 MG/ML
20 INJECTION, SOLUTION INTRAVENOUS ONCE AS NEEDED
Status: DISCONTINUED | OUTPATIENT
Start: 2024-08-16 | End: 2024-08-19 | Stop reason: HOSPADM

## 2024-08-16 RX ORDER — NIFEDIPINE 10 MG/1
10 CAPSULE ORAL ONCE AS NEEDED
Status: DISCONTINUED | OUTPATIENT
Start: 2024-08-16 | End: 2024-08-19 | Stop reason: HOSPADM

## 2024-08-16 RX ORDER — ONDANSETRON 4 MG/1
4 TABLET, FILM COATED ORAL EVERY 6 HOURS PRN
Status: DISCONTINUED | OUTPATIENT
Start: 2024-08-16 | End: 2024-08-19 | Stop reason: HOSPADM

## 2024-08-16 RX ORDER — FENTANYL/BUPIVACAINE/NS/PF 2MCG/ML-.1
PLASTIC BAG, INJECTION (ML) INJECTION AS NEEDED
Status: DISCONTINUED | OUTPATIENT
Start: 2024-08-16 | End: 2024-08-16

## 2024-08-16 RX ORDER — BISACODYL 10 MG/1
10 SUPPOSITORY RECTAL DAILY PRN
Status: DISCONTINUED | OUTPATIENT
Start: 2024-08-16 | End: 2024-08-19 | Stop reason: HOSPADM

## 2024-08-16 RX ORDER — DIPHENHYDRAMINE HCL 25 MG
25 CAPSULE ORAL EVERY 6 HOURS PRN
Status: DISCONTINUED | OUTPATIENT
Start: 2024-08-16 | End: 2024-08-19 | Stop reason: HOSPADM

## 2024-08-16 RX ORDER — ACETAMINOPHEN 325 MG/1
975 TABLET ORAL EVERY 6 HOURS
Status: DISCONTINUED | OUTPATIENT
Start: 2024-08-16 | End: 2024-08-19 | Stop reason: HOSPADM

## 2024-08-16 RX ORDER — FENTANYL/BUPIVACAINE/NS/PF 2MCG/ML-.1
0-54 PLASTIC BAG, INJECTION (ML) INJECTION CONTINUOUS
Status: DISCONTINUED | OUTPATIENT
Start: 2024-08-16 | End: 2024-08-16

## 2024-08-16 RX ORDER — LIDOCAINE 560 MG/1
1 PATCH PERCUTANEOUS; TOPICAL; TRANSDERMAL
Status: DISCONTINUED | OUTPATIENT
Start: 2024-08-16 | End: 2024-08-19 | Stop reason: HOSPADM

## 2024-08-16 RX ORDER — MISOPROSTOL 200 UG/1
800 TABLET ORAL ONCE AS NEEDED
Status: DISCONTINUED | OUTPATIENT
Start: 2024-08-16 | End: 2024-08-19 | Stop reason: HOSPADM

## 2024-08-16 RX ORDER — DIPHENHYDRAMINE HYDROCHLORIDE 50 MG/ML
25 INJECTION INTRAMUSCULAR; INTRAVENOUS EVERY 6 HOURS PRN
Status: DISCONTINUED | OUTPATIENT
Start: 2024-08-16 | End: 2024-08-19 | Stop reason: HOSPADM

## 2024-08-16 RX ORDER — CARBOPROST TROMETHAMINE 250 UG/ML
250 INJECTION, SOLUTION INTRAMUSCULAR ONCE AS NEEDED
Status: DISCONTINUED | OUTPATIENT
Start: 2024-08-16 | End: 2024-08-19 | Stop reason: HOSPADM

## 2024-08-16 RX ORDER — ENOXAPARIN SODIUM 100 MG/ML
40 INJECTION SUBCUTANEOUS EVERY 24 HOURS
Status: DISCONTINUED | OUTPATIENT
Start: 2024-08-16 | End: 2024-08-19 | Stop reason: HOSPADM

## 2024-08-16 RX ORDER — DIPHENHYDRAMINE HYDROCHLORIDE 50 MG/ML
25 INJECTION INTRAMUSCULAR; INTRAVENOUS ONCE
Status: COMPLETED | OUTPATIENT
Start: 2024-08-16 | End: 2024-08-16

## 2024-08-16 RX ORDER — ONDANSETRON HYDROCHLORIDE 2 MG/ML
4 INJECTION, SOLUTION INTRAVENOUS EVERY 6 HOURS PRN
Status: DISCONTINUED | OUTPATIENT
Start: 2024-08-16 | End: 2024-08-19 | Stop reason: HOSPADM

## 2024-08-16 RX ORDER — OXYTOCIN 10 [USP'U]/ML
10 INJECTION, SOLUTION INTRAMUSCULAR; INTRAVENOUS ONCE AS NEEDED
Status: DISCONTINUED | OUTPATIENT
Start: 2024-08-16 | End: 2024-08-19 | Stop reason: HOSPADM

## 2024-08-16 RX ORDER — METHYLERGONOVINE MALEATE 0.2 MG/ML
0.2 INJECTION INTRAVENOUS ONCE AS NEEDED
Status: DISCONTINUED | OUTPATIENT
Start: 2024-08-16 | End: 2024-08-19 | Stop reason: HOSPADM

## 2024-08-16 RX ORDER — POLYETHYLENE GLYCOL 3350 17 G/17G
17 POWDER, FOR SOLUTION ORAL 2 TIMES DAILY PRN
Status: DISCONTINUED | OUTPATIENT
Start: 2024-08-16 | End: 2024-08-19 | Stop reason: HOSPADM

## 2024-08-16 RX ORDER — LOPERAMIDE HYDROCHLORIDE 2 MG/1
4 CAPSULE ORAL EVERY 2 HOUR PRN
Status: DISCONTINUED | OUTPATIENT
Start: 2024-08-16 | End: 2024-08-19 | Stop reason: HOSPADM

## 2024-08-16 RX ORDER — HYDRALAZINE HYDROCHLORIDE 20 MG/ML
5 INJECTION INTRAMUSCULAR; INTRAVENOUS ONCE AS NEEDED
Status: DISCONTINUED | OUTPATIENT
Start: 2024-08-16 | End: 2024-08-19 | Stop reason: HOSPADM

## 2024-08-16 RX ORDER — ADHESIVE BANDAGE
10 BANDAGE TOPICAL
Status: DISCONTINUED | OUTPATIENT
Start: 2024-08-16 | End: 2024-08-19 | Stop reason: HOSPADM

## 2024-08-16 RX ORDER — TRANEXAMIC ACID 100 MG/ML
1000 INJECTION, SOLUTION INTRAVENOUS ONCE AS NEEDED
Status: DISCONTINUED | OUTPATIENT
Start: 2024-08-16 | End: 2024-08-19 | Stop reason: HOSPADM

## 2024-08-16 RX ORDER — SIMETHICONE 80 MG
80 TABLET,CHEWABLE ORAL 4 TIMES DAILY PRN
Status: DISCONTINUED | OUTPATIENT
Start: 2024-08-16 | End: 2024-08-19 | Stop reason: HOSPADM

## 2024-08-16 RX ORDER — LORAZEPAM 0.5 MG/1
1 TABLET ORAL ONCE
Status: COMPLETED | OUTPATIENT
Start: 2024-08-16 | End: 2024-08-16

## 2024-08-16 RX ORDER — IBUPROFEN 600 MG/1
600 TABLET ORAL EVERY 6 HOURS
Status: DISCONTINUED | OUTPATIENT
Start: 2024-08-16 | End: 2024-08-19 | Stop reason: HOSPADM

## 2024-08-16 ASSESSMENT — PAIN SCALES - GENERAL
PAINLEVEL_OUTOF10: 0 - NO PAIN

## 2024-08-16 ASSESSMENT — LIFESTYLE VARIABLES
TOTAL_SCORE: 26
TOTAL_SCORE: 3

## 2024-08-16 NOTE — DISCHARGE INSTRUCTIONS

## 2024-08-16 NOTE — L&D DELIVERY NOTE
OB Delivery Note  2024  Leigh Ann Joy  28 y.o.   Vaginal, Spontaneous       Gestational Age: 37w4d  /Para:   Quantitative Blood Loss: 100cc    . Loose nuchal cord x1, reduced.  Placenta delivered spontaneously after 15 min with fundal massage and downward traction. No lacerations. Postplacental liletta IUD placed under ultrasound guidance. IUD visualized at fundus. Strings trimmed.     Carol Joy [[id number]]      Labor Events    Sac identifier: Sac 1  Rupture date/time: 8/15/2024 2305  Rupture type: Artificial  Fluid color: Bloody  Fluid odor: None  Labor type: Induced Onset of Labor  Labor allowed to proceed with plans for an attempted vaginal birth?: Yes  Induction: Oxytocin, AROM  Complications: None       Labor Event Times    Labor onset date/time: 8/15/2024 2015  Dilation complete date/time: 2024 0550  Start pushing date/time: 2024 0550       Labor Length    1st stage: 9h 35m  2nd stage: 0h 02m  3rd stage: 0h 17m       Placenta    Placenta delivery date/time: 2024 0609  Placenta removal: Spontaneous  Placenta appearance: Intact  Placenta disposition: pathology       Cord    Vessels: 3 vessels  Complications: Nuchal  Nuchal intervention: reduced  Nuchal cord description: tight nuchal cord  Number of loops: 1  Delayed cord clamping?: Yes  Cord clamped date/time: 2024 05:53:00  Cord blood disposition: Lab  Gases sent?: No       Lacerations    Episiotomy: None  Perineal laceration: None  Other lacerations?: No  Repair suture: None       Anesthesia    Method: Epidural       Operative Delivery    Forceps attempted?: No  Vacuum extractor attempted?: No       Shoulder Dystocia    Shoulder dystocia present?: No        Delivery    Time head delivered: 2024 05:52:00  Birth date/time: 2024 05:52:00  Delivery type: Vaginal, Spontaneous  Complications: None       Resuscitation    Method: Suctioning, Tactile stimulation       Apgars    Living status:  Living  Apgar Component Scores:  1 min.:  5 min.:  10 min.:  15 min.:  20 min.:    Skin color:  1  1       Heart rate:  1  2       Reflex irritability:  2  2       Muscle tone:  2  2       Respiratory effort:  2  2       Total:  8  9       Apgars assigned by: CHAYO GUTIERREZ       Delivery Providers    Delivering clinician: Daniela Bass MD   Provider Role    Ba Galvan, RN Delivery Nurse    Sabas Warner, MATT Nursery Nurse    Charley Gan MD Resident                 Intrauterine Device Inserted: Yes, after vaginal delivery   Intrauterine Device Inserted: IUD Device Type: Liletta  Ultrasound Guidance: Yes    IUD after Vaginal Delivery Consent and Procedure Statement:     Consent: The risks, benefits, and alternatives to immediate postplacental intrauterine device insertion were discussed with the patient.  Specifically, we discussed the possible risks of bleeding, infection, perforation, failure, increased risk of ectopic should pregnancy occur, and the higher risk of expulsion.  Written consent was obtained prior to the procedure and is detailed in the patient’s record.      Procedure: The intrauterine device was placed at the fundus using standard technique.    Kendal Hope MD

## 2024-08-16 NOTE — H&P
Obstetrical Admission History and Physical    Assessment/Plan    Leigh Ann Joy is a 28 y.o.  at 37w4d, SRI: 2024, by Ultrasound admitted for IOL in setting of gHTN    IOL  Admitted, consented, scanned, cephalic  Cervix favorable, will induce with AROM and pitocin  Epidural as requested  GBS unknown, collected. Pcn   EFW 8# by leopolds CEFM, currently Cat I  Delivery plan: patient desires vaginal delivery, patient counseled on risks of labor, vaginal delivery, and possibility of C/S for varying maternal or fetal indications    gHTN  Diagnosed by 2 MRBPs > 4hrs apart  Asymptomatic  HELLP labs on admission pending; P:C 0.16  Normotensive to mild range    DREW  Daily cocaine use (smokes), including today  Consented to UDS on admission - pos for cocaine  SW consulted  Haldol prn for agitation    Other pregnancy notables:   Hx of syphilis s/p treatment and re-treatment in March. RPR neg during pregnancy. Pos TPPA  Hx of trich, s/p treatment, GOLD collected  Does not have custody of other children, SW consulted  Hx of seizure disorder, no seizures since age 10. No meds since age 17    Postpartum planning:  Contraception: pp LNG IUD    Patient seen and discussed with Dr. William Hope MD  OBGYN    Subjective   Patient feeling well. Occasional contractions. Reports cocaine use around 5pm. No other drug use    Pregnancy notable for:  Medical Problems       Problem List       Epilepsy, partial (Multi)    Overview Signed 2024  1:05 PM by Deborah Pat PA-C     No meds currently         History of marijuana use    Seizure disorder (Multi)    Smoker    Trichomoniasis    Overview Signed 2024  1:04 PM by Deborah Pat PA-C     GOLD pending          High-risk pregnancy ([facility]-H[facility])    BMI 40.0-44.9, adult (Multi)    History of syphilis    Overview Addendum 2024  1:04 PM by Deborah Pat PA-C     10/30/23: No titer drawn when treated for syphilis 10/30/23- Dx by reactive RPR and TPPA. Last  nonreactive RPR testing 10/23/21, > 1 yr from positive testing. Patient treated for early latent/primary/secondary syphilis with PCN x1.   3/18/24: RPR nonreactive. Treated with PCN x1 again in ED for rash.  24: RPR nonreactive.  24: RPR nonreactive.   *Consider treatment for late latent syphilis given unknown duration of infection.         Substance abuse (Multi)    Overview Addendum 2024  1:04 PM by Deborah Pat PA-C     Enrolled in [facility] program  Was previously living in [facility]  Drugs of choice: marijuana and crack cocaine  Last use 24 (smoked crack cocaine)           Lost custody of children    Overview Signed 2024 12:36 PM by FRANNY Sun     First two with their fathers  Second two with foster families          Food insecurity    Gestational hypertension, third trimester ([facility])    Overview Signed 2024  1:06 PM by Deborah Pat PA-C     Diagnosed by mild range BP x 2 in triage   HELLP labs negative, P:C 0.16  Needs twice weekly  testing and 37 wga IOL          Rash          Obstetrical History   OB History    Para Term  AB Living   8 4 4 0 3 4   SAB IAB Ectopic Multiple Live Births   2 1     4      # Outcome Date GA Lbr Carlos/2nd Weight Sex Type Anes PTL Lv   8 Current            7 Term 10/23/21    F    ANDERS   6 Term 11/10/19    F Vag-Spont   ANDERS   5 Term 16   3.685 kg M Vag-Spont   ANDERS   4 Term 05/13/15   3.657 kg M Vag-Spont   ANDERS   3 SAB            2 SAB            1 IAB                Past Medical History  History reviewed. No pertinent past medical history.     Past Surgical History   Past Surgical History:   Procedure Laterality Date    INDUCED       OTHER SURGICAL HISTORY  2014    Reported Prior Surgical / Procedural History       Social History  Social History     Tobacco Use    Smoking status: Every Day     Current packs/day: 0.25     Types: Cigarettes    Smokeless tobacco: Never   Substance Use  "Topics    Alcohol use: Never     Substance and Sexual Activity   Drug Use Not Currently    Types: \"Crack\" cocaine, Marijuana    Comment: 1 month sober       Allergies  Penicillins     Medications  Medications Prior to Admission   Medication Sig Dispense Refill Last Dose    clobetasol (Temovate) 0.05 % cream Apply topically 2 times a day. To arms for 2 weeks. Avoid on face, skin folds, genitals. 45 g 1     diphenhydrAMINE (Sominex) 25 mg tablet Take 1 tablet (25 mg) by mouth as needed at bedtime for sleep. 30 tablet 2     prenatal vitamin, iron-folic, 27 mg iron-800 mcg folic acid tablet Take 1 tablet by mouth once daily. 30 tablet 0        Objective    Last Vitals  Temp Pulse Resp BP MAP O2 Sat   36.3 °C (97.3 °F) 91 18 111/58         Physical Examination  General: no acute distress  HEENT: normocephalic, atraumatic  Heart: warm and well perfused  Lungs: breathing comfortably on room air  Abdomen: gravid  Extremities: moving all extremities  Neuro: awake and conversant  Psych: appropriate mood and affect  SVE: 3/50/-3  FHT: 130/mod/+accel/-decel  Coulter: irritable  BSUS: cephalic    Fetal head palpated with no other presenting parts. AROM for clear fluid.      Lab Review  Labs in chart have been reviewed.    "

## 2024-08-16 NOTE — PROGRESS NOTES
Social Work Assessment     Patient: Leigh Ann Joy, 29yo,   Address: reports she will stay at a Hotel at discharge  Phone: [phone]    Referral Reason: substance use disorder, housing concerns, mental health, custody    Prenatal Care: [facility] x 1    Sadler Name: Caden Joy, MR# [id number]   : 24    Other Children:   Oldest 2 in custody of their father  Younger 2 in DCFS custody (each removed at birth)    FOB: Ms Joy reports she is not in a relationship with FOB. She states her viday Ricks is her support.    Household Composition: Ms Joy reports she will stay in a hotel with Rambo after discharge. She denies housing concerns and states she does not sapna shelter information or other resources at this time.     Supports: Ms Joy identifies Rambo as her support.    IPV/DV or Safety Concerns: Ms Joy denies IPV/safety concerns.     Substance Use History: Ms Joy with a history of DREW. Per chart admits daily cocaine use. She was at [facility] in April but did not complete treatment. Drug screen 8/15/24 + cocaine, 24 negative. Ms Joy declining treatment resources at this time, [facility].     Mental Health Diagnoses/Concerns: Ms Joy denies signs and symptoms at this time. She states her mood is stable and denies concerns. She states she will connected through treatment through DCFS if required. FRANCI reviewed postpartum depression signs, symptoms, and resources.    Department of Children and Family Services (DCFS): FRANCI made referral to DCFS.   Call ID #[phone]. Assigned DCFS worker Amy Corrales out to assess. She reports DCFS will have staffing and reports plan is for custody. Per DCFS, Ms Joy aware of plan to request custody, accepting. DCFS reports will work toward  discharge but concerned may be delayed until Monday.     Assessment: FRANCI met with Ms Joy for assessment, she was accepting with limited engagement. She acknowledged  DCFS plan for custody. She states she plans to  discharge to hot with Rambo on  and denies needs/concerns.     Plan: SW will remain available for support and assistance as needed and will update regarding DCFS plan. Please do not discharge  until safe plan in place.     Signature: SELINA Woods                                                                  Physical Exam

## 2024-08-16 NOTE — ANESTHESIA PREPROCEDURE EVALUATION
Patient: Leigh Ann Joy    Evaluation Method: In-person visit    Procedure Information    Date: 08/15/24  Procedure: Labor Consult         Relevant Problems   Anesthesia (within normal limits)      Cardiac (within normal limits)      Pulmonary (within normal limits)      Neuro   (+) Epilepsy, partial (Multi)   (+) Seizure disorder (Multi)      GI (within normal limits)      /Renal (within normal limits)      Liver (within normal limits)      Endocrine (within normal limits)      Hematology (within normal limits)      Musculoskeletal (within normal limits)      ID   (+) Trichomoniasis      Skin   (+) Rash      GYN   (+) High-risk pregnancy (HHS-HCC)       Clinical information reviewed:    Allergies  Meds               NPO Detail:  No data recorded     OB/Gyn Evaluation    Present Pregnancy    Patient is pregnant now.  (+) , no prenatal care   Obstetric History            Physical Exam    Airway  Mallampati: I  TM distance: >3 FB     Cardiovascular - normal exam  Rhythm: regular     Dental   Comments: Tooth #9 is cracked    Pulmonary - normal exam     Abdominal        Anesthesia Plan    History of general anesthesia?: yes  History of complications of general anesthesia?: no    ASA 3     epidural   (Talked about spinal and general anesthesia as well. )    Plan discussed with attending.

## 2024-08-16 NOTE — SIGNIFICANT EVENT
LABOR PROGRESS NOTE  SUBJECTIVE  Patient doing well with epidural and pitocin infusing. Feeling intermittent contractions.     OBJECTIVE  Visit Vitals  /63   Pulse 83   Temp 36.8 °C (98.2 °F) (Oral)   Resp 18        Cervical Exam  Dilation: 6  Effacement (%): 100  Fetal Station: -1  Presentation: Vertex (per Jayy CABEZAS)  Method: Manual  OB Examiner: Malik CABEZAS  Fetal Assessment  Movement: Present  Mode: Fetal scalp electrode  Baseline Fetal Heart Rate (bpm): 130 bpm  Baseline Classification: Normal  Variability: Moderate (Between 6 and 25 BPM)  Pattern: Accelerations, Variable decelerations  Pattern Observations:  (RN remains at the bedside attempting to readjust CFM at this time. Difficulty maintaining CFM d/t pt positioning, habitus, and pt agitation.)  FHR Category: Category I      Contraction Frequency: 1.5-4.5    A&P    IOL  - Continue to monitor for cervical change  - continue pitocin  - Variable decelerations, will start amnioinfusion    Charley Gan MD

## 2024-08-16 NOTE — ANESTHESIA PROCEDURE NOTES
Epidural Block    Patient location during procedure: OB  Start time: 8/16/2024 12:16 AM  End time: 8/16/2024 12:25 AM  Reason for block: labor analgesia  Staffing  Performed: resident   Authorized by: Lars Atkinson DO    Performed by: Tyler Alicia DO    Preanesthetic Checklist  Completed: patient identified, IV checked, risks and benefits discussed, surgical consent, pre-op evaluation, timeout performed and sterile techniques followed  Block Timeout  RN/Licensed healthcare professional reads aloud to the Anesthesia provider and entire team: Patient identity, procedure with side and site, patient position, and as applicable the availability of implants/special equipment/special requirements.  Patient on coagulant treatment: no  Timeout performed at: 8/16/2024 12:13 AM  Block Placement  Patient position: sitting  Prep: ChloraPrep  Sterility prep: drape, gloves, hand, mask and cap  Sedation level: no sedation  Patient monitoring: blood pressure, continuous pulse oximetry and heart rate  Approach: midline  Local numbing: lidocaine 1% to skin and subcutaneous tissues  Vertebral space: lumbar  Lumbar location: L3-L4  Epidural  Loss of resistance technique: saline  Guidance: landmark technique        Needle  Needle type: Tuohy   Needle gauge: 17  Needle length: 8.9cm  Needle insertion depth: 6.5 cm  Catheter type: multi-orifice  Catheter size: 19 G  Catheter at skin depth: 11.5 cm  Catheter securement method: clear occlusive dressing    Test dose: lidocaine 1.5% with epinephrine 1-to-200,000  Test dose: lidocaine 1.5% with epinephrine 1-to-200,000  Test dose result: no positive test dose    PCEA  Medication concentration used: 0.044% Bupivacaine with 1.25 mcg/mL Fentanyl and [id number] Epinephrine  Dose (mL): 10  Lockout (minutes): 15  1-Hour Limit (boluses/hr): 4  Basal Rate: 14        Assessment  Sensory level: T10 bilateral  Block outcome: patient comfortable  Number of attempts: 1  Events: no positive test  dose  Procedure assessment: patient tolerated procedure well with no immediate complications

## 2024-08-16 NOTE — PROGRESS NOTES
Social Work Note    Patient: Leigh Ann Joy    FRANCI met briefly with Ms Joy this morning, she was sleepy but did acknowledge SW. She declined to engage in assessment at this time, sleepy. SW reviewed need for DCFS involvement and reminded Ms Joy they would be coming today. SW agreed to come back later in the day for full assessment. SW made referral to DCFS, Call ID#[phone].  FRANCI later received a call from assigned DCFS worker Amy Tse [phone]. She states she will be out this afternoon to assess. Ms Joy aware, accepting but expressed irritation.   SW will remain involved for additional assessment and discharge planning. SW will update regarding DCFS plan. Please do not discharge  until safe plan in place.     SELINA Woods

## 2024-08-16 NOTE — SIGNIFICANT EVENT
LABOR PROGRESS NOTE  SUBJECTIVE  Patient doing well with epidural and pitocin infusing. Feeling intermittent contractions.     OBJECTIVE  Visit Vitals  /61   Pulse 76   Temp 37 °C (98.6 °F) (Oral)   Resp 18        Cervical Exam  Dilation: 8  Effacement (%): 100  Fetal Station: 0  Presentation: Vertex (per Jayy CABEZAS)  Method: Manual  OB Examiner: Malik CABEZAS  Fetal Assessment  Movement: Present  Mode: Fetal scalp electrode  Baseline Fetal Heart Rate (bpm): 135 bpm  Baseline Classification: Normal  Variability: Moderate (Between 6 and 25 BPM)  Pattern: Accelerations, Variable decelerations  Pattern Observations:  (RN remains at the bedside attempting to readjust CFM at this time. Difficulty maintaining CFM d/t pt positioning, habitus, and pt agitation.)  FHR Category: Category I      Contraction Frequency: 1.5-3.5    A&P    IOL  2300 3/50/-3 AROM  2330 pit started  0100 FSE, IUPC  0515 6/100/-1  0537 Pit off for recurrent decels   0537 8/100/0  - Continue to monitor for cervical change  - continue pitocin  - Variable decelerations, pit turned off, continue amnioinfusion    Amine MD Malik

## 2024-08-16 NOTE — SIGNIFICANT EVENT
Called to bedside for patient agitation and difficulty tracing FHR    Patient received haldol 1 hour ago. Patient restless and reports feeling anxious. Will give PO ativan at this time    FSE and IUPC placed without difficulty    Latent labor  -s/p arom  -continue pitocin per protocol  -gbs pending, no ppx   -FSE: cat 1 currently  -epidural infusing    Kendal Hope MD PGY-2

## 2024-08-16 NOTE — SIGNIFICANT EVENT
BP Cuff and Home Monitoring  Patient meets criteria for home monitoring of blood pressure post discharge.  Reason:  current gestational hypertension. Met with patient to assess for availability of home BP monitor.   Patient does not have access to BP monitor at home.  Pt agreed to order home BP monitor from [employer].   Large BP monitor delivered to room.. Patient educated on importance of continuing to monitor BP at home, recording BP on home monitoring log and s/sx of when to call her provider.  Pt verbalized understanding the above information.

## 2024-08-16 NOTE — CARE PLAN
The patient's goals for the shift include  have a healthy baby and a safe delivery.    The clinical goals for the shift include  maintain pain and agitation through labor course.

## 2024-08-16 NOTE — ANESTHESIA PREPROCEDURE EVALUATION
Patient: Leigh Ann Joy    Evaluation Method: In-person visit    Procedure Information    Date: 08/15/24  Procedure: Labor Consult     28 y.o. . 34w1d d/b 19wk US     Relevant Problems   Anesthesia (within normal limits)      Cardiac (within normal limits)      Pulmonary (within normal limits)      Neuro  Substance use disorder - cocaine   (+) Epilepsy, partial (Multi)   (+) Seizure disorder (Multi)      GI (within normal limits)      /Renal (within normal limits)      Liver (within normal limits)      Endocrine (within normal limits)      Hematology (within normal limits)      Musculoskeletal (within normal limits)      ID  Syphilis - Dx Oct 2023 confirmed with TPPA, re-treated in 2024 and RPR was neg at that time     (+) Trichomoniasis      Skin   (+) Rash      GYN   (+) High-risk pregnancy (HHS-HCC)     Vitals:    08/15/24 2015   BP: (!) 147/86   Pulse: 96   Resp: 18   Temp: 36.3 °C (97.3 °F)       Past Surgical History:   Procedure Laterality Date    INDUCED       OTHER SURGICAL HISTORY  2014    Reported Prior Surgical / Procedural History     No past medical history on file.    Current Facility-Administered Medications:     carboprost (Hemabate) injection 250 mcg, 250 mcg, intramuscular, Once PRN, Kendal Hope MD    fentaNYL (PF)-BUPivacaine - Epi 1.25 mcg/mL- 0.044 % epidural infusion, 0-54 mL/hr, epidural, Continuous, Lars Atkinson DO    haloperidol lactate (Haldol) injection 5 mg, 5 mg, intravenous, q6h PRN, Kendal Hope MD, 5 mg at 24 0006    hydrALAZINE (Apresoline) injection 5 mg, 5 mg, intravenous, Once PRN, Kendal Hope MD    labetaloL (Normodyne,Trandate) injection 20 mg, 20 mg, intravenous, Once PRN, Kendal Hope MD    lactated Ringer's bolus 500 mL, 500 mL, intravenous, Once PRN, Kendal Hope MD    lactated Ringer's infusion, 125 mL/hr, intravenous, Continuous, Kendal Hope MD, Last Rate: 125 mL/hr at 08/15/24 2336, 125 mL/hr at 08/15/24 2336     levonorgestreL (LILETTA) IUD, , intrauterine, Once, Kendal Hope MD    lidocaine (Xylocaine) 10 mg/mL (1 %) injection 30 mL, 30 mL, subcutaneous, Once PRN, Kendal Hope MD    loperamide (Imodium) capsule 4 mg, 4 mg, oral, q2h PRN, Kendal Hope MD    methylergonovine (Methergine) injection 0.2 mg, 0.2 mg, intramuscular, Once PRN, Kendal Hope MD    metoclopramide (Reglan) tablet 10 mg, 10 mg, oral, q6h PRN **OR** metoclopramide (Reglan) injection 10 mg, 10 mg, intravenous, q6h PRN, Kendal Hope MD    miSOPROStoL (Cytotec) tablet 800 mcg, 800 mcg, rectal, Once PRN, Kendal Hope MD    NIFEdipine (Procardia) capsule 10 mg, 10 mg, oral, Once PRN, Kendal Hope MD    ondansetron (Zofran) tablet 4 mg, 4 mg, oral, q6h PRN **OR** ondansetron (Zofran) injection 4 mg, 4 mg, intravenous, q6h PRN, Kendal Hope MD    oxytocin (Pitocin) bolus from bag, 600 los-units/min, intravenous, Once PRN, Kendal Hope MD    oxytocin (Pitocin) bolus from bag, 600 los-units/min, intravenous, Once PRN, Kendal Hope MD    oxytocin (Pitocin) infusion in sodium chloride 0.9% 30 units/500 mL, 60 los-units/min, intravenous, Once PRN, Kendal Hope MD    oxytocin (Pitocin) infusion in sodium chloride 0.9% 30 units/500 mL, 60 los-units/min, intravenous, Continuous, Kendal Hope MD    oxytocin (Pitocin) infusion in sodium chloride 0.9% 30 units/500 mL, 2-30 los-units/min, intravenous, Continuous, Kendal Hope MD, Last Rate: 2 mL/hr at 08/15/24 2338, 2 los-units/min at 08/15/24 2338    oxytocin (Pitocin) injection 10 Units, 10 Units, intramuscular, Once PRN, Kendal Hope MD    terbutaline (Brethine) injection 0.25 mg, 0.25 mg, subcutaneous, Once PRN, Kendal Hope MD    tranexamic acid (Cyklokapron) injection 1,000 mg, 1,000 mg, intravenous, Once PRN, Kendal Hope MD  Prior to Admission medications    Medication Sig Start Date End Date Taking? Authorizing Provider   clobetasol (Temovate) 0.05 % cream Apply topically 2 times a  day. To arms for 2 weeks. Avoid on face, skin folds, genitals. 7/25/24   Yaa Benitez MD   diphenhydrAMINE (Sominex) 25 mg tablet Take 1 tablet (25 mg) by mouth as needed at bedtime for sleep. 7/24/24   Deborah Pat PA-C   prenatal vitamin, iron-folic, 27 mg iron-800 mcg folic acid tablet Take 1 tablet by mouth once daily. 7/24/24   Deborah Pat PA-C     Allergies   Allergen Reactions    Penicillins Itching     Pt received a shot of PCN in March and denies any allergic reaction       Social History     Tobacco Use    Smoking status: Every Day     Current packs/day: 0.25     Types: Cigarettes    Smokeless tobacco: Never   Substance Use Topics    Alcohol use: Never         Chemistry    Lab Results   Component Value Date/Time     07/24/2024 1149    K 3.6 07/24/2024 1149     07/24/2024 1149    CO2 18 (L) 07/24/2024 1149    BUN 8 07/24/2024 1149    CREATININE 0.51 07/24/2024 1149    Lab Results   Component Value Date/Time    CALCIUM 8.3 (L) 07/24/2024 1149    ALKPHOS 111 (H) 07/24/2024 1149    AST 23 07/24/2024 1149    ALT 31 07/24/2024 1149    BILITOT 0.4 07/24/2024 1149          Lab Results   Component Value Date/Time    WBC 10.5 08/15/2024 2128    HGB 12.5 08/15/2024 2128    HCT 38.6 08/15/2024 2128     08/15/2024 2128     Clinical information reviewed:    Allergies  Meds               NPO Detail:  No data recorded     OB/Gyn Evaluation    Present Pregnancy    Patient is pregnant now.  (+) , no prenatal care   Obstetric History                Physical Exam    Airway  Mallampati: I  TM distance: >3 FB     Cardiovascular - normal exam  Rhythm: regular     Dental   Comments: Tooth #9 is cracked    Pulmonary - normal exam     Abdominal          Anesthesia Plan    History of general anesthesia?: yes  History of complications of general anesthesia?: no    ASA 3     epidural   (Talked about spinal and general anesthesia as well. )    Plan discussed with attending.

## 2024-08-17 PROCEDURE — 1100000001 HC PRIVATE ROOM DAILY

## 2024-08-17 PROCEDURE — 2500000002 HC RX 250 W HCPCS SELF ADMINISTERED DRUGS (ALT 637 FOR MEDICARE OP, ALT 636 FOR OP/ED)

## 2024-08-17 PROCEDURE — 2500000001 HC RX 250 WO HCPCS SELF ADMINISTERED DRUGS (ALT 637 FOR MEDICARE OP): Performed by: STUDENT IN AN ORGANIZED HEALTH CARE EDUCATION/TRAINING PROGRAM

## 2024-08-17 PROCEDURE — 2500000001 HC RX 250 WO HCPCS SELF ADMINISTERED DRUGS (ALT 637 FOR MEDICARE OP)

## 2024-08-17 RX ORDER — NIFEDIPINE 30 MG/1
30 TABLET, FILM COATED, EXTENDED RELEASE ORAL
Status: DISCONTINUED | OUTPATIENT
Start: 2024-08-17 | End: 2024-08-19 | Stop reason: HOSPADM

## 2024-08-17 RX ORDER — DIPHENHYDRAMINE HCL 25 MG
25 CAPSULE ORAL EVERY 6 HOURS PRN
Status: DISCONTINUED | OUTPATIENT
Start: 2024-08-17 | End: 2024-08-19 | Stop reason: HOSPADM

## 2024-08-17 RX ORDER — TALC
3 POWDER (GRAM) TOPICAL NIGHTLY PRN
Status: DISCONTINUED | OUTPATIENT
Start: 2024-08-17 | End: 2024-08-19 | Stop reason: HOSPADM

## 2024-08-17 ASSESSMENT — PAIN SCALES - GENERAL
PAINLEVEL_OUTOF10: 0 - NO PAIN
PAINLEVEL_OUTOF10: 5 - MODERATE PAIN

## 2024-08-17 NOTE — ANESTHESIA POSTPROCEDURE EVALUATION
Leigh Ann Joy is a 28 y.o., , who had a Vaginal, Spontaneous delivery on 2024 at 37w4d and is now POD1.    She had Neuraxial Anesthesia without immediate complications noted.       Pain well controlled    Vitals:    24 0708   BP: 128/79   Pulse: 71   Resp: 17   Temp: 36 °C (96.8 °F)   SpO2: 97%       Neuraxial site assessed. No visible redness or swelling or drainage. Patient able to ambulate and move all extremities without difficulty. Able to void. No complaints of nausea/vomiting. Tolerating PO intake well. No s/sx of PDPH.     Anesthesia will sign off     Iggy Galloway MD

## 2024-08-17 NOTE — PROGRESS NOTES
Postpartum Progress Note    Assessment/Plan   Leigh Ann Joy is a 28 y.o., , who delivered at 37w4d gestation    Now PPD#1 s/p Vaginal, Spontaneous on 2024  - continue routine postpartum care  - pain well controlled on po medications  - dvt risk score DVT Score: 5 , ppx with lovenox   - Hgb:   Results from last 7 days   Lab Units 08/15/24  2128   HEMOGLOBIN g/dL 12.5      gHTN  - dx by 2 mild range pressures >4hrs apart; asymptomatic  - Started on Nifed 30 ( PM)   - HELLP labs negative  - BP cuff for home  - reviewed 3 day stay for BP monitoring. pt verbalized understanding    DREW  - UDS + cocaine on admission  - Desires discharge to rehab facility, SW re-engaged as yesterday declined recovery resources   - Can consider haldol PRN if agitation arises     Syphilis  - dx , s/p treatment  - RPR neg throughout pregnancy     Maternal Well-Being  - emotional support provided    [place] Feeding  - bottle    Contraception  - education provided  - ppIUD    Dispo  - Anticipate DC PPD3 pending BP control.  - The signs and symptoms of PEC were reviewed with the patient, including unrelenting headache, vision changes/blurred vision, and pain underneath the right breast.   - BP cuff for home for checking BP BID. Pt instructed to call primary OB if SBP > 160 or DBP > 110.   Follow up in 2-5 days for BP check with your OB provider. and Follow up in 4-6 wk for postpartum visit with your OB provider.     Dorothy Herrera PA-C  Postpartum Pager [phone]    Active Problems:    Rash    Pregnancy Problems (from 24 to present)       Problem Noted Resolved    Gestational hypertension, third trimester (HHS-HCC) 2024 by Deborah Pat PA-C No    Priority:  Medium      Overview Signed 2024  1:06 PM by Deborah Pat PA-C     Diagnosed by mild range BP x 2 in triage   HELLP labs negative, P:C 0.16  Needs twice weekly  testing and 37 wga IOL          Substance abuse (Multi) 2024 by Sara MANE  Steffi, JD-CNM No    Priority:  Medium      Overview Addendum 7/24/2024  1:04 PM by Deborah Pat PA-C     Enrolled in [facility] program  Was previously living in [facility]  Drugs of choice: marijuana and crack cocaine  Last use 7/23/24 (smoked crack cocaine)           History of syphilis 4/9/2024 by Nikki Reece, APRN-CNP No    Priority:  Medium      Overview Addendum 7/24/2024  1:04 PM by Deborah Pat PA-C     10/30/23: No titer drawn when treated for syphilis 10/30/23- Dx by reactive RPR and TPPA. Last nonreactive RPR testing 10/23/21, > 1 yr from positive testing. Patient treated for early latent/primary/secondary syphilis with PCN x1.   3/18/24: RPR nonreactive. Treated with PCN x1 again in ED for rash.  4/6/24: RPR nonreactive.  7/23/24: RPR nonreactive.   *Consider treatment for late latent syphilis given unknown duration of infection.         Epilepsy, partial (Multi) 11/3/2023 by Esther Roy No    Priority:  Medium      Overview Signed 7/24/2024  1:05 PM by Deborah Pat PA-C     No meds currently         Trichomoniasis 11/3/2023 by Esther Roy No    Priority:  Medium      Overview Signed 7/24/2024  1:04 PM by Deborah Pat PA-C     GOLD pending                Hospital course: gestational hypertension  Vaginal Birth  Patient is not breastfeedingThe patient's blood type is A POS.     Subjective   Her pain is well controlled with current medications  She is passing flatus  She is ambulating well  She is tolerating a Adult diet Regular  She reports no breast or nursing problems  She denies emotional concerns today   Her plan for contraception is IUD w/progesterone     Pt seen at the bedside in NAD. Patient denies pain. Reports feeling agitated and hot/sweaty. She cannot identify if it feels like she is withdrawing bc she has been using daily and has not had withdraw sx as of recent.  She is aware DCFS will take custody of baby. She does plan to get sober  and wants to try to re-gain custody of baby. She requests help with rehab facility of discharge. Denies CP, SOB, calf pain, fever, passage of large clots.     Objective   Allergies:   Patient has no known allergies.         Last Vitals:  Temp Pulse Resp BP MAP Pulse Ox   36.1 °C (97 °F) 109 17 (!) 151/84   97 %     Vitals Min/Max Last 24 Hours:  Temp  Min: 36 °C (96.8 °F)  Max: 36.3 °C (97.3 °F)  Pulse  Min: 61  Max: 109  Resp  Min: 16  Max: 20  BP  Min: 112/68  Max: 153/87    Intake/Output:   No intake or output data in the 24 hours ending 08/17/24 1407    Physical Exam:  General: Examination reveals a well developed, well nourished, female, in no acute distress. She is alert and cooperative.  HEENT: External ears normal. Nose normal, no erythema or discharge.  Neck: supple, no significant adenopathy.  Lungs: breathing even and unlabored   Cardiac: warm and well perfused .  Fundus: firm and below umbilicus. Lochia light  Extremities: no redness or tenderness in the calves or thighs, no edema.  Neurological: alert, oriented, normal speech, no focal findings or movement disorder noted.  Psychological: awake and alert; oriented to person, place, and time.  Skin: chronic dermatitis on b/l UE and abdomen, no new skin changes     Lab Data:  Labs in chart were reviewed.

## 2024-08-18 PROBLEM — O13.9 GESTATIONAL HYPERTENSION (HHS-HCC): Status: ACTIVE | Noted: 2024-08-18

## 2024-08-18 PROBLEM — F19.90 SUBSTANCE USE DISORDER: Status: ACTIVE | Noted: 2024-04-12

## 2024-08-18 LAB
APPEARANCE UR: CLEAR
BACTERIA #/AREA URNS AUTO: ABNORMAL /HPF
BILIRUB UR STRIP.AUTO-MCNC: NEGATIVE MG/DL
COLOR UR: ABNORMAL
GLUCOSE UR STRIP.AUTO-MCNC: NORMAL MG/DL
GP B STREP GENITAL QL CULT: NORMAL
KETONES UR STRIP.AUTO-MCNC: NEGATIVE MG/DL
LEUKOCYTE ESTERASE UR QL STRIP.AUTO: ABNORMAL
MUCOUS THREADS #/AREA URNS AUTO: ABNORMAL /LPF
NITRITE UR QL STRIP.AUTO: ABNORMAL
PH UR STRIP.AUTO: 6 [PH]
PROT UR STRIP.AUTO-MCNC: NEGATIVE MG/DL
RBC # UR STRIP.AUTO: ABNORMAL /UL
RBC #/AREA URNS AUTO: ABNORMAL /HPF
SP GR UR STRIP.AUTO: 1.02
SQUAMOUS #/AREA URNS AUTO: ABNORMAL /HPF
UROBILINOGEN UR STRIP.AUTO-MCNC: NORMAL MG/DL
WBC #/AREA URNS AUTO: ABNORMAL /HPF

## 2024-08-18 PROCEDURE — 2500000001 HC RX 250 WO HCPCS SELF ADMINISTERED DRUGS (ALT 637 FOR MEDICARE OP)

## 2024-08-18 PROCEDURE — 1100000001 HC PRIVATE ROOM DAILY

## 2024-08-18 PROCEDURE — 2500000002 HC RX 250 W HCPCS SELF ADMINISTERED DRUGS (ALT 637 FOR MEDICARE OP, ALT 636 FOR OP/ED)

## 2024-08-18 PROCEDURE — 81001 URINALYSIS AUTO W/SCOPE: CPT

## 2024-08-18 PROCEDURE — 2500000001 HC RX 250 WO HCPCS SELF ADMINISTERED DRUGS (ALT 637 FOR MEDICARE OP): Performed by: STUDENT IN AN ORGANIZED HEALTH CARE EDUCATION/TRAINING PROGRAM

## 2024-08-18 PROCEDURE — S4991 NICOTINE PATCH NONLEGEND: HCPCS

## 2024-08-18 PROCEDURE — 2500000004 HC RX 250 GENERAL PHARMACY W/ HCPCS (ALT 636 FOR OP/ED): Performed by: STUDENT IN AN ORGANIZED HEALTH CARE EDUCATION/TRAINING PROGRAM

## 2024-08-18 PROCEDURE — 87086 URINE CULTURE/COLONY COUNT: CPT

## 2024-08-18 RX ORDER — IBUPROFEN 200 MG
1 TABLET ORAL DAILY
Status: DISCONTINUED | OUTPATIENT
Start: 2024-08-18 | End: 2024-08-19 | Stop reason: HOSPADM

## 2024-08-18 RX ORDER — DIPHENHYDRAMINE HCL 25 MG
4 CAPSULE ORAL EVERY 2 HOUR PRN
Status: DISCONTINUED | OUTPATIENT
Start: 2024-08-18 | End: 2024-08-19 | Stop reason: HOSPADM

## 2024-08-18 ASSESSMENT — PAIN SCALES - GENERAL
PAINLEVEL_OUTOF10: 0 - NO PAIN
PAINLEVEL_OUTOF10: 5 - MODERATE PAIN
PAINLEVEL_OUTOF10: 0 - NO PAIN

## 2024-08-18 ASSESSMENT — PAIN DESCRIPTION - LOCATION
LOCATION: BACK

## 2024-08-18 ASSESSMENT — PAIN DESCRIPTION - DESCRIPTORS: DESCRIPTORS: SORE;ACHING

## 2024-08-18 NOTE — PROGRESS NOTES
Postpartum Progress Note    Assessment/Plan   Leigh Ann Joy is a 28 y.o., , who delivered at 37w4d gestation    Now PPD#2 s/p Vaginal, Spontaneous on 2024  - continue routine postpartum care  - pain well controlled on po medications  - dvt risk score DVT Score: 5 , ppx with lovenox   - Hgb:   Results from last 7 days   Lab Units 08/15/24  2128   HEMOGLOBIN g/dL 12.5      gHTN  - dx by 2 mild range pressures >4hrs apart; asymptomatic  - Started on Nifed 30 ( PM)   - HELLP labs negative  - BP cuff for home  - reviewed 3 day stay for BP monitoring. pt verbalized understanding    DREW  - UDS + cocaine on admission  - Desires discharge to rehab facility, SW re-engaged as initially declined recovery resources   - Can consider haldol PRN if agitation arises   - Nicotine patch provided     Syphilis  - dx , s/p treatment  - RPR neg throughout pregnancy     Maternal Well-Being  - emotional support provided    [place] Feeding  - bottle    Contraception  - education provided  - ppIUD    Dispo  - Anticipate DC PPD3 pending BP control.  - The signs and symptoms of PEC were reviewed with the patient, including unrelenting headache, vision changes/blurred vision, and pain underneath the right breast.   - BP cuff for home for checking BP BID. Pt instructed to call primary OB if SBP > 160 or DBP > 110.   Follow up in 2-5 days for BP check with your OB provider. and Follow up in 4-6 wk for postpartum visit with your OB provider.     Dorothy Herrera PA-C  Postpartum Pager [phone]    Active Problems:    Rash    Pregnancy Problems (from 24 to present)       Problem Noted Resolved    Gestational hypertension, third trimester ([facility]) 2024 by Deborah Pat PA-C No    Priority:  Medium      Overview Signed 2024  1:06 PM by Deborah Pat PA-C     Diagnosed by mild range BP x 2 in triage   HELLP labs negative, P:C 0.16  Needs twice weekly  testing and 37 wga IOL          Substance abuse  (Multi) 4/12/2024 by Sara Kapadia, APRN-CNM No    Priority:  Medium      Overview Addendum 7/24/2024  1:04 PM by Deborah Pat PA-C     Enrolled in [facility] program  Was previously living in [facility]  Drugs of choice: marijuana and crack cocaine  Last use 7/23/24 (smoked crack cocaine)           History of syphilis 4/9/2024 by Nikki Reece, APRN-CNP No    Priority:  Medium      Overview Addendum 7/24/2024  1:04 PM by Deborah Pat PA-C     10/30/23: No titer drawn when treated for syphilis 10/30/23- Dx by reactive RPR and TPPA. Last nonreactive RPR testing 10/23/21, > 1 yr from positive testing. Patient treated for early latent/primary/secondary syphilis with PCN x1.   3/18/24: RPR nonreactive. Treated with PCN x1 again in ED for rash.  4/6/24: RPR nonreactive.  7/23/24: RPR nonreactive.   *Consider treatment for late latent syphilis given unknown duration of infection.         Epilepsy, partial (Multi) 11/3/2023 by Esther Roy No    Priority:  Medium      Overview Signed 7/24/2024  1:05 PM by Deborah Pat PA-C     No meds currently         Trichomoniasis 11/3/2023 by Esther Roy No    Priority:  Medium      Overview Signed 7/24/2024  1:04 PM by Deborah Pat PA-C     GOLD pending                Hospital course: gestational hypertension  Vaginal Birth  Patient is not breastfeeding. The patient's blood type is A POS.     Subjective   Her pain is well controlled with current medications  She is passing flatus  She is ambulating well  She is tolerating a Adult diet Regular  She reports no breast or nursing problems  She denies emotional concerns today   Her plan for contraception is IUD w/progesterone     Pt seen at the bedside in NAD. Patient denies pain. Ambulating in room and outside to vape. Discussed hospital policy of no vaping in hospital. She requests help with rehab facility of discharge. Denies CP, SOB, calf pain, fever, passage of large clots.      Objective   Allergies:   Patient has no known allergies.         Last Vitals:  Temp Pulse Resp BP MAP Pulse Ox   36.2 °C (97.2 °F) 94 18 131/71   97 %     Vitals Min/Max Last 24 Hours:  Temp  Min: 35.7 °C (96.3 °F)  Max: 37 °C (98.6 °F)  Pulse  Min: 94  Max: 109  Resp  Min: 16  Max: 18  BP  Min: 131/71  Max: 151/84    Intake/Output:   No intake or output data in the 24 hours ending 08/18/24 1033    Physical Exam:  General: Examination reveals a well developed, well nourished, female, in no acute distress. She is alert and cooperative.  HEENT: External ears normal. Nose normal, no erythema or discharge.  Neck: supple, no significant adenopathy.  Lungs: breathing even and unlabored   Cardiac: warm and well perfused .  Fundus: firm and below umbilicus. Lochia light  Extremities: no redness or tenderness in the calves or thighs, no edema.  Neurological: alert, oriented, normal speech, no focal findings or movement disorder noted.  Psychological: awake and alert; oriented to person, place, and time.  Skin: chronic dermatitis on b/l UE and abdomen, no new skin changes     Lab Data:  Labs in chart were reviewed.

## 2024-08-19 ENCOUNTER — PHARMACY VISIT (OUTPATIENT)
Dept: PHARMACY | Facility: CLINIC | Age: 29
End: 2024-08-19
Payer: MEDICAID

## 2024-08-19 VITALS
DIASTOLIC BLOOD PRESSURE: 82 MMHG | TEMPERATURE: 97.7 F | HEART RATE: 101 BPM | BODY MASS INDEX: 39.26 KG/M2 | RESPIRATION RATE: 22 BRPM | OXYGEN SATURATION: 98 % | HEIGHT: 63 IN | SYSTOLIC BLOOD PRESSURE: 122 MMHG | WEIGHT: 221.56 LBS

## 2024-08-19 LAB — HOLD SPECIMEN: NORMAL

## 2024-08-19 PROCEDURE — S4991 NICOTINE PATCH NONLEGEND: HCPCS

## 2024-08-19 PROCEDURE — 99239 HOSP IP/OBS DSCHRG MGMT >30: CPT | Performed by: NURSE PRACTITIONER

## 2024-08-19 PROCEDURE — 2500000001 HC RX 250 WO HCPCS SELF ADMINISTERED DRUGS (ALT 637 FOR MEDICARE OP): Performed by: STUDENT IN AN ORGANIZED HEALTH CARE EDUCATION/TRAINING PROGRAM

## 2024-08-19 PROCEDURE — RXMED WILLOW AMBULATORY MEDICATION CHARGE

## 2024-08-19 PROCEDURE — 2500000002 HC RX 250 W HCPCS SELF ADMINISTERED DRUGS (ALT 637 FOR MEDICARE OP, ALT 636 FOR OP/ED)

## 2024-08-19 RX ORDER — IBUPROFEN 600 MG/1
600 TABLET ORAL EVERY 6 HOURS PRN
Qty: 60 TABLET | Refills: 0 | Status: SHIPPED | OUTPATIENT
Start: 2024-08-19

## 2024-08-19 RX ORDER — CYANOCOBALAMIN (VITAMIN B-12) 500 MCG
1 TABLET ORAL NIGHTLY PRN
Qty: 30 TABLET | Refills: 0 | Status: SHIPPED | OUTPATIENT
Start: 2024-08-19

## 2024-08-19 RX ORDER — NIFEDIPINE 30 MG/1
30 TABLET, FILM COATED, EXTENDED RELEASE ORAL
Qty: 42 TABLET | Refills: 0 | Status: SHIPPED | OUTPATIENT
Start: 2024-08-20 | End: 2024-10-01

## 2024-08-19 RX ORDER — DIPHENHYDRAMINE HCL 25 MG
25 TABLET ORAL NIGHTLY PRN
Qty: 30 TABLET | Refills: 0 | Status: SHIPPED | OUTPATIENT
Start: 2024-08-19

## 2024-08-19 RX ORDER — POLYETHYLENE GLYCOL 3350 17 G/17G
17 POWDER, FOR SOLUTION ORAL 2 TIMES DAILY PRN
Qty: 14 PACKET | Refills: 0 | Status: SHIPPED | OUTPATIENT
Start: 2024-08-19

## 2024-08-19 RX ORDER — ACETAMINOPHEN 325 MG/1
975 TABLET ORAL EVERY 6 HOURS PRN
Qty: 120 TABLET | Refills: 0 | Status: SHIPPED | OUTPATIENT
Start: 2024-08-19

## 2024-08-19 ASSESSMENT — PAIN SCALES - GENERAL
PAINLEVEL_OUTOF10: 0 - NO PAIN
PAINLEVEL_OUTOF10: 5 - MODERATE PAIN

## 2024-08-19 NOTE — DISCHARGE SUMMARY
Discharge Summary    Admission Date: 8/15/2024  Discharge Date: 8/19/2024    Discharge Diagnosis  Vaginal Delivery    Hospital Course  Delivery Date: 8/16/2024 5:52 AM  Delivery type: Vaginal, Spontaneous   GA at delivery: 37w4d  Outcome: Living  Anesthesia during delivery: Epidural  Intrapartum complications: None  Feeding method: Breastfeeding Status: No     Procedures: IUD insertion  Contraception at discharge: ppIUD    Pertinent Physical Exam At Time of Discharge    General: examination reveals a well developed, well nourished, female, in no acute distress. She is alert and cooperative.  HEENT: external ears normal. Nose normal, no erythema or discharge.  Neck: supple, no significant adenopathy  Lungs: breathing even and unlabored, lungs clear all fields  Cardiac: warm and well perfused, heart rate regular rate and rhythm, no murmur  Fundus: firm and below umbilicus, lochia light  Abdominal: soft, non tender, non-distended, bowel sounds active, + flatus  Extremities: no redness or tenderness in the calves or thighs, non pitting edema BLE.  Neurological: alert, oriented, normal speech, no focal findings or movement disorder noted.  Psychological: awake and alert; oriented to person, place, and time.  Skin: warm, dry, no s/s infection with dermatitis rash    Last Vitals:  Temp Pulse Resp BP MAP Pulse Ox   36.5 °C (97.7 °F) 101 22 122/82 95 98 %     Discharge Meds     Your medication list        START taking these medications        Instructions Last Dose Given Next Dose Due   acetaminophen 325 mg tablet  Commonly known as: Tylenol      Take 3 tablets (975 mg) by mouth every 6 hours if needed for mild pain (1 - 3) or moderate pain (4 - 6).       ibuprofen 600 mg tablet      Take 1 tablet (600 mg) by mouth every 6 hours if needed for mild pain (1 - 3) or moderate pain (4 - 6).       NIFEdipine ER 30 mg 24 hr tablet  Commonly known as: Adalat CC  Start taking on: August 20, 2024      Take 1 tablet (30 mg) by mouth  once daily in the morning. Take before meals. Do not crush, chew, or split.       polyethylene glycol 17 gram packet  Commonly known as: Glycolax, Miralax      Take 17 g by mouth 2 times a day as needed (constipation).              CONTINUE taking these medications        Instructions Last Dose Given Next Dose Due   clobetasol 0.05 % cream  Commonly known as: Temovate      Apply topically 2 times a day. To arms for 2 weeks. Avoid on face, skin folds, genitals.              STOP taking these medications      diphenhydrAMINE 25 mg tablet  Commonly known as: Sominex        prenatal vitamin (iron-folic) 27 mg iron-800 mcg folic acid tablet                  Where to Get Your Medications        These medications were sent to [facility]  [address] [place] [id number]      Hours: 8:30 AM to 5 PM Mon-Fri Phone: [phone]   acetaminophen 325 mg tablet  ibuprofen 600 mg tablet  NIFEdipine ER 30 mg 24 hr tablet  polyethylene glycol 17 gram packet          Complications Requiring Follow-Up  gHTN  - dx by 2 mild range pressures >4hrs apart; asymptomatic  - Started on Nifed 30 (8/17 PM), script sent  - HELLP labs negative  - given BP cuff for home     DREW  - UDS + cocaine on admission  - Desires discharge to rehab facility, SW re-engaged as yesterday declined recovery resources   - planning discharge to [facility], FRANCI to facilitate  - referral to [facility] clinic for pt to follow up within 2 wks, emailed [facility] with info     Syphilis  - dx 2023, s/p treatment  - RPR neg throughout pregnancy     Test Results Pending At Discharge  Pending Labs       Order Current Status    Surgical Pathology Exam - [facility]AC[facility]A Collected (08/16/24 0657)    Surgical Pathology Exam - PLAC[facility]A In process    Urine Culture In process            Outpatient Follow-Up  Future Appointments   Date Time Provider Department Center   9/4/2024  1:00 PM [id number]     - Stable for [facility]  today. Infant in DCFS custody.  - The signs and symptoms of PEC were reviewed with the patient, including unrelenting headache, vision changes/blurred vision, and pain underneath the right breast.   - BP cuff for home for checking BP BID. Pt instructed to call primary OB if SBP > 160 or DBP > 110.  - On discharge, follow up with primary OB in 2-5 days for BP check and 4-6 weeks for postpartum visit.    I spent 30 minutes in the professional and overall care of this patient.      Bailee Singh, APRN-CNP

## 2024-08-19 NOTE — CARE PLAN
Problem: Postpartum  Goal: Minimal s/sx of HDP and BP<160/110  Outcome: Progressing  Goal: Experiences normal postpartum course  Outcome: Progressing  Goal: Appropriate maternal -  bonding  Outcome: Progressing  Goal: Establish and maintain infant feeding pattern for adequate nutrition  Outcome: Progressing  Goal: Incisions, wounds, or drain sites healing without S/S of infection  Outcome: Progressing  Goal: No s/sx infection  Outcome: Progressing  Goal: No s/sx of hemorrhage  Outcome: Progressing  Goal: Minimal s/sx of HDP and BP<160/110  Outcome: Progressing     Problem: Pain - Adult  Goal: Verbalizes/displays adequate comfort level or baseline comfort level  Outcome: Progressing     Problem: Safety - Adult  Goal: Free from fall injury  Outcome: Progressing     Problem: Discharge Planning  Goal: Discharge to home or other facility with appropriate resources  Outcome: Progressing     Problem: Chronic Conditions and Co-morbidities  Goal: Patient's chronic conditions and co-morbidity symptoms are monitored and maintained or improved  Outcome: Progressing

## 2024-08-19 NOTE — SIGNIFICANT EVENT
C/f elopement    House officer called by nurse due to concerns over potential patient elopement. Patient is postpartum day 2 from a  with substuance use disorder and plan to be discharged to a rehab facility tomorrow morning. Discussed with patient at bedside the implications of leaving AMA and patient agreed to stay until tomorrow morning. Patient requested a different nicotine patch as the one she has is not working. Will try and order.

## 2024-08-19 NOTE — PROGRESS NOTES
"Social Work Note    Patient: Leigh Ann Joy    FRANCI Spoke with DCFS worker. Worker reports court at 2pm, DCFS able to obtain spot at [place] for Ms Joy and will transport her there  for substance use treatment after court. SW checked in with Ms Joy, she is aware and ready to leave for court. She states \"\" Rambo is coming to get her and take her there. No additional needs at this time. FRANCI will remain in contact with DCFS and update regarding plan for  after court. Please do not discharge  until safe plan in place.     SELINA Woods    "

## 2024-08-19 NOTE — PROGRESS NOTES
Social Work Note    Patient: Leigh Ann Joy    SW spoke to assigned DCFS worker Amy Corrales. She states staffing complete and Ms Joy informed that DCFS is requesting custody. DCFS worker also reports DCFS advocate arranging treatment for Ms Joy, Kimberly not available at this time but will update regarding this as available.   SW attempted to meet with Ms Joy to check in, she was off the floor. SW to remain available. Please do not discharge  until DCFS plan in place.     SELINA Woods

## 2024-08-20 ENCOUNTER — PHARMACY VISIT (OUTPATIENT)
Dept: PHARMACY | Facility: CLINIC | Age: 29
End: 2024-08-20
Payer: MEDICAID

## 2024-08-20 PROCEDURE — 2720000007 HC OR 272 NO HCPCS

## 2024-08-21 ENCOUNTER — TELEPHONE (OUTPATIENT)
Dept: OBSTETRICS AND GYNECOLOGY | Facility: HOSPITAL | Age: 29
End: 2024-08-21
Payer: MEDICAID

## 2024-08-21 DIAGNOSIS — N30.00 ACUTE CYSTITIS WITHOUT HEMATURIA: Primary | ICD-10-CM

## 2024-08-21 LAB — BACTERIA UR CULT: ABNORMAL

## 2024-08-21 RX ORDER — NITROFURANTOIN 25; 75 MG/1; MG/1
100 CAPSULE ORAL 2 TIMES DAILY
Qty: 10 CAPSULE | Refills: 0 | Status: SHIPPED | OUTPATIENT
Start: 2024-08-21 | End: 2024-08-26

## 2024-08-21 NOTE — TELEPHONE ENCOUNTER
Called Kimberly as we have not heard back from medical team.  recommends sending to [facility] on [address] and they will be able to deliver medication to facility.    Mindi Galicia, APRN-CNP

## 2024-08-21 NOTE — TELEPHONE ENCOUNTER
Called to inform patient she does have UTI. Previously had urinary sx prior to discharge from postpartum. She is staying at [facility] and she requested I call  to see where patient can get medication from. Patient verbally gave permission for me to speak with [facility] staff about needing this medication.     Left message with [facility]. They will call back triage phone with where we are able to send rx for patient to have access to antibiotic.    CLIFF WestonC

## 2024-08-23 LAB
LABORATORY COMMENT REPORT: NORMAL
PATH REPORT.FINAL DX SPEC: NORMAL
PATH REPORT.GROSS SPEC: NORMAL
PATH REPORT.RELEVANT HX SPEC: NORMAL
PATH REPORT.TOTAL CANCER: NORMAL

## 2024-10-10 ENCOUNTER — CLINICAL SUPPORT (OUTPATIENT)
Dept: NUTRITION | Facility: HOSPITAL | Age: 29
End: 2024-10-10
Payer: MEDICAID

## 2024-10-10 NOTE — PROGRESS NOTES
[employer]  Diet Recommendation 1: Pregnancy and Breastfeeding  Diet Recommendation 2: Sodium, Low  Diet Recommendation 3: Healthy Eating  Food Intolerance Avoidance: NKFA. (Partner does not eat pork)  Household Size: 2 Family Members  Interventions: Referral Number: 1st 6 Mo Referral 6 Mos  Interventions: Visit Number: 3 of 6 Visits - Max 6 Visits/Referral Each 6 Mo Period  Education Today: [employer] Meals  Follow Up Notes for Future Visits: Proxy sent for quick food pickup. Pt and  doing well. No update on whether WIC has provided pump.  Grains: 25-50% Whole  Fruit: 50-75% Fresh  Vegetables: 50-75% Fresh  Proteins: 3 Plant-based Items  Dairy: 25-50% Lowfat  Originating Site of Referral Order: [facility]  Initials of RD Assisting Today: MARTIN

## 2024-10-11 ENCOUNTER — OFFICE VISIT (OUTPATIENT)
Dept: OBSTETRICS AND GYNECOLOGY | Facility: HOSPITAL | Age: 29
End: 2024-10-11
Payer: MEDICAID

## 2024-10-11 VITALS — DIASTOLIC BLOOD PRESSURE: 57 MMHG | BODY MASS INDEX: 39.61 KG/M2 | WEIGHT: 223.6 LBS | SYSTOLIC BLOOD PRESSURE: 93 MMHG

## 2024-10-11 DIAGNOSIS — Z30.431 IUD CHECK UP: ICD-10-CM

## 2024-10-11 PROCEDURE — 99213 OFFICE O/P EST LOW 20 MIN: CPT | Performed by: OBSTETRICS & GYNECOLOGY

## 2024-10-11 RX ORDER — GUAIFENESIN 600 MG/1
1200 TABLET, EXTENDED RELEASE ORAL 2 TIMES DAILY
COMMUNITY

## 2024-10-11 RX ORDER — TRAZODONE HYDROCHLORIDE 100 MG/1
100 TABLET ORAL NIGHTLY
COMMUNITY

## 2024-10-11 RX ORDER — TRAMADOL HYDROCHLORIDE 50 MG/1
TABLET ORAL
COMMUNITY

## 2024-10-11 NOTE — PROGRESS NOTES
Subjective   Patient ID: Leigh Ann Joy is a 29 y.o. female who presents for Postpartum Follow-up (Patient here for follow up after delivering 8/16/2024 vaginally).  HPI    Patient presents today for a postpartum follow up. She is accompanies by the father of her baby.    She had an induced vaginal delivery on 8/16. Did have preeclampsia.   States her blood pressures have been normal.   Currently on nifedipine ER 30 mg once daily     She did not breast feed/pump.  Denies any breast tenderness. Denies any vaginal pain or tenderness.    Reports that she is doing well. She is in rehab at [facility].  She does not currently have custody of her child, nor her other children.   This is her fifth child, he is currently in foster care.  She does have an IUD in place, mirena. Was inserted the day she had her baby. Denies any abnormal bleeding.  No longer has periods after the mirena was placed. Does get cramping.    Patient does vape and smokes cigarettes.  Endorses she has been sober for over 1 month, was previously using cocaine and marijuana.   Leaves rehab 1 week from now and going to sober living facility.     Last Pap 4/11/2024, WNL, HPV neg    Review of Systems  All pertinent positive symptoms are included in the history of present illness.    All other systems have been reviewed and are negative and noncontributory to this patient's current ailments.    Objective   Physical Exam  Constitutional: Alert and in no acute distress. Well developed, well nourished.   Neck: no neck asymmetry. Supple   Pulmonary: No respiratory distress   Abdomen: soft nontender; no abdominal mass palpated   Genitourinary: external genitalia: normal and perianal area: normal   Vagina: normal.   Cervix: Normal. IUD strings not seen or palpated.   Skin: normal skin color and pigmentation, normal skin turgor, and no rash.   Psychiatric: alert and oriented x 3., affect normal to patient baseline and mood: appropriate       Assessment/Plan    Diagnoses and all orders for this visit:  Postpartum exam ([facility])  Patient doing well.  IUD check up  -     US PELVIS TRANSABDOMINAL WITH TRANSVAGINAL; Future         We will follow up in 1 year or sooner if needed.    This patient was seen and staffed with Dr. Chaidez.    Mary Hope DO  PGY 2  Family Medicine

## 2024-10-15 ENCOUNTER — HOSPITAL ENCOUNTER (OUTPATIENT)
Dept: RADIOLOGY | Facility: HOSPITAL | Age: 29
Discharge: HOME | End: 2024-10-15
Payer: MEDICAID

## 2024-10-15 DIAGNOSIS — Z30.431 IUD CHECK UP: ICD-10-CM

## 2024-10-15 PROCEDURE — 76856 US EXAM PELVIC COMPLETE: CPT | Performed by: RADIOLOGY

## 2024-10-15 PROCEDURE — 76830 TRANSVAGINAL US NON-OB: CPT | Performed by: RADIOLOGY

## 2024-10-15 PROCEDURE — 76856 US EXAM PELVIC COMPLETE: CPT

## 2024-11-12 ENCOUNTER — CLINICAL SUPPORT (OUTPATIENT)
Dept: NUTRITION | Facility: HOSPITAL | Age: 29
End: 2024-11-12
Payer: MEDICAID

## 2024-11-12 NOTE — PROGRESS NOTES
[employer] For [employer]  Diet Recommendation 1: Pregnancy and Breastfeeding  Diet Recommendation 2: Sodium, Low  Diet Recommendation 3: Healthy Eating  Food Intolerance Avoidance: NKFA. (Partner does not eat pork)  Household Size: 2 Family Members  Interventions: Referral Number: 1st 6 Mo Referral 6 Mos  Interventions: Visit Number: 4 of 6 Visits - Max 6 Visits/Referral Each 6 Mo Period  Education Today: Healthy Eating on a Budget  Follow Up Notes for Future Visits: Proxy came for quick food pickup. No particular nutrition concerns.  Grains: 25-50% Whole  Fruit: 50-75% Fresh  Vegetables: 50-75% Fresh  Proteins: 3 Plant-based Items  Dairy: 25-50% Lowfat  Originating Site of Referral Order: [facility]  Initials of RD Assisting Today: MARTIN

## 2024-11-25 ENCOUNTER — HOSPITAL ENCOUNTER (EMERGENCY)
Facility: HOSPITAL | Age: 29
Discharge: HOME | End: 2024-11-25
Payer: MEDICAID

## 2024-11-25 ENCOUNTER — PHARMACY VISIT (OUTPATIENT)
Dept: PHARMACY | Facility: CLINIC | Age: 29
End: 2024-11-25
Payer: MEDICAID

## 2024-11-25 VITALS
BODY MASS INDEX: 38.98 KG/M2 | HEIGHT: 63 IN | OXYGEN SATURATION: 97 % | TEMPERATURE: 97.5 F | WEIGHT: 220 LBS | DIASTOLIC BLOOD PRESSURE: 80 MMHG | SYSTOLIC BLOOD PRESSURE: 115 MMHG | RESPIRATION RATE: 18 BRPM | HEART RATE: 84 BPM

## 2024-11-25 DIAGNOSIS — L30.4 INTERTRIGO: Primary | ICD-10-CM

## 2024-11-25 PROCEDURE — 99283 EMERGENCY DEPT VISIT LOW MDM: CPT

## 2024-11-25 PROCEDURE — RXMED WILLOW AMBULATORY MEDICATION CHARGE

## 2024-11-25 RX ORDER — NYSTATIN 100000 [USP'U]/G
1 POWDER TOPICAL 2 TIMES DAILY
Qty: 30 G | Refills: 0 | Status: SHIPPED | OUTPATIENT
Start: 2024-11-25 | End: 2024-12-02

## 2024-11-25 RX ORDER — ACETAMINOPHEN 500 MG
500 TABLET ORAL EVERY 6 HOURS PRN
Qty: 20 TABLET | Refills: 0 | Status: SHIPPED | OUTPATIENT
Start: 2024-11-25 | End: 2024-11-30

## 2024-11-25 NOTE — ED PROVIDER NOTES
"HPI   Chief Complaint   Patient presents with    Rash       HPI  Leigh Ann Joy is a 29 year old female with history of eplipsy not on medication presenting to the ED for a rash on the pelvis for 3 days.  Patient states the rash is \"red and irritating.\" Patient denies purulent drainage and vesicles to the rash.  Patient states the rash is painful but denies itchiness.  Patient states she used vasaline on the rash last night but it did not help.  Patient denies new soaps, detergents, and lotions.  Patient denies using new medications and antibiotics.  Patient denies chest pain, shortness of breath, abdominal pain, nausea, vomiting, fevers, body aches, chills.       Patient History   History reviewed. No pertinent past medical history.  Past Surgical History:   Procedure Laterality Date    INDUCED       OTHER SURGICAL HISTORY  2014    Reported Prior Surgical / Procedural History     Family History   Problem Relation Name Age of Onset    Epilepsy Father       Social History     Tobacco Use    Smoking status: Every Day     Current packs/day: 0.25     Types: Cigarettes    Smokeless tobacco: Never   Vaping Use    Vaping status: Not on file   Substance Use Topics    Alcohol use: Never    Drug use: Not Currently     Types: \"Crack\" cocaine, Marijuana     Comment: 1 month sober       Physical Exam   ED Triage Vitals [24 1439]   Temperature Heart Rate Respirations BP   36.4 °C (97.5 °F) (!) 101 16 118/79      Pulse Ox Temp Source Heart Rate Source Patient Position   96 % Skin Monitor Sitting      BP Location FiO2 (%)     Left arm --       Physical Exam  Vitals and nursing note reviewed.   Constitutional:       Appearance: Normal appearance.   Cardiovascular:      Rate and Rhythm: Normal rate and regular rhythm.      Heart sounds: Normal heart sounds. No murmur heard.     No gallop.   Pulmonary:      Effort: Pulmonary effort is normal. No respiratory distress.      Breath sounds: Normal breath sounds. No " stridor. No wheezing, rhonchi or rales.   Abdominal:      General: Abdomen is flat. Bowel sounds are normal. There is no distension.      Palpations: Abdomen is soft. There is no mass.      Tenderness: There is no abdominal tenderness. There is no guarding or rebound.      Hernia: No hernia is present.   Musculoskeletal:      Right lower leg: No edema.      Left lower leg: No edema.   Skin:     General: Skin is warm and dry.      Findings: Rash present.      Comments: Please see photo in media section of patients chart for photo of rash.  Rash is tender.  No purulent drainage or vesicles.   Neurological:      General: No focal deficit present.      Mental Status: She is alert and oriented to person, place, and time.   Psychiatric:         Mood and Affect: Mood normal.         Behavior: Behavior normal.           ED Course & MDM   Diagnoses as of 11/25/24 1542   Intertrigo                 No data recorded     [place] Coma Scale Score: 15 (11/25/24 1441 : Trent Mann RN)                           Medical Decision Making  This is a 29-year-old female presenting to the ED for ration pelvis for 3 days.  Photo of the rash we found in media section of patient's chart.  The rash is tender.  No purulent drainage or open vesicles.  Given the appearance of the rash and the rash is in the crease of her lower abdomen the most likely cause is intertrigo.  Patient will be given prescription for nystatin powder for treatment of intertrigo.  Patient denies using new soaps, detergents, lotions as well as itchiness.  Rash does not correlate with contact dermatitis.  Low suspicion the rash is due to using new medications given denies using new medications and antibiotics.  Low suspicion for cellulitis given presentation of the rash.  Patient has been hemodynamically stable while in the ED today.  Patient's heart rate was initially tachycardic with heart rate of 101 when arriving to the ED however while in the ED patient's heart  rate improved to 84.    Disposition: Discharge home  Patient was advised to follow-up with a primary care provider for further evaluation management of the rash.  Referral and contact information for family medicine clinic has been included in discharge paperwork.  Prescription for nystatin powder for treatment of intertrigo has been sent to patient's pharmacy.  Tylenol for pain relief from the rash has also been sent to the patient's pharmacy.  Patient advised to take the medications as prescribed.  Patient was advised to keep the area to the lower abdomen clean and dry to prevent additional rash.  Strict return precautions were given.  Plan was discussed with the patient and the patient understands and agrees.  Patient was discharged in stable condition.    Procedure  Procedures     Sb Paige PA-C  11/26/24 0259

## 2024-11-25 NOTE — DISCHARGE INSTRUCTIONS
Please follow up with a primary care provider for further evaluation and management of the rash.  Referral and contact information for primary care clinic has been included in discharge paperwork.  Nystatin powder for treatment of the rash has been sent to your pharmacy.  Tylenol for pain has also been sent to your pharmacy.  Please take medications as prescribed.  Ensure that you keep the area clean and dry to prevent additional rashes.  Return to the emergency department if symptoms persist or worsen.

## 2025-03-16 ENCOUNTER — HOSPITAL ENCOUNTER (OUTPATIENT)
Facility: HOSPITAL | Age: 30
Setting detail: OBSERVATION
Discharge: HOME | End: 2025-03-17
Attending: EMERGENCY MEDICINE | Admitting: INTERNAL MEDICINE
Payer: MEDICAID

## 2025-03-16 DIAGNOSIS — Z65.3 LOST CUSTODY OF CHILDREN: ICD-10-CM

## 2025-03-16 DIAGNOSIS — L98.499: Primary | ICD-10-CM

## 2025-03-16 DIAGNOSIS — F19.90 SUBSTANCE USE DISORDER: ICD-10-CM

## 2025-03-16 DIAGNOSIS — R21 RASH: ICD-10-CM

## 2025-03-16 PROCEDURE — 99285 EMERGENCY DEPT VISIT HI MDM: CPT | Performed by: EMERGENCY MEDICINE

## 2025-03-16 ASSESSMENT — PAIN DESCRIPTION - PROGRESSION: CLINICAL_PROGRESSION: NOT CHANGED

## 2025-03-16 ASSESSMENT — PAIN DESCRIPTION - LOCATION: LOCATION: BREAST

## 2025-03-16 ASSESSMENT — PAIN SCALES - GENERAL: PAINLEVEL_OUTOF10: 10 - WORST POSSIBLE PAIN

## 2025-03-16 ASSESSMENT — PAIN DESCRIPTION - ORIENTATION: ORIENTATION: LEFT

## 2025-03-16 ASSESSMENT — LIFESTYLE VARIABLES
HAVE YOU EVER FELT YOU SHOULD CUT DOWN ON YOUR DRINKING: NO
TOTAL SCORE: 0
EVER FELT BAD OR GUILTY ABOUT YOUR DRINKING: NO
HAVE PEOPLE ANNOYED YOU BY CRITICIZING YOUR DRINKING: NO
EVER HAD A DRINK FIRST THING IN THE MORNING TO STEADY YOUR NERVES TO GET RID OF A HANGOVER: NO

## 2025-03-16 ASSESSMENT — COLUMBIA-SUICIDE SEVERITY RATING SCALE - C-SSRS
6. HAVE YOU EVER DONE ANYTHING, STARTED TO DO ANYTHING, OR PREPARED TO DO ANYTHING TO END YOUR LIFE?: NO
1. IN THE PAST MONTH, HAVE YOU WISHED YOU WERE DEAD OR WISHED YOU COULD GO TO SLEEP AND NOT WAKE UP?: NO
2. HAVE YOU ACTUALLY HAD ANY THOUGHTS OF KILLING YOURSELF?: NO

## 2025-03-16 ASSESSMENT — PAIN - FUNCTIONAL ASSESSMENT: PAIN_FUNCTIONAL_ASSESSMENT: 0-10

## 2025-03-17 ENCOUNTER — APPOINTMENT (OUTPATIENT)
Dept: RADIOLOGY | Facility: HOSPITAL | Age: 30
End: 2025-03-17
Payer: MEDICAID

## 2025-03-17 ENCOUNTER — PHARMACY VISIT (OUTPATIENT)
Dept: PHARMACY | Facility: CLINIC | Age: 30
End: 2025-03-17
Payer: MEDICAID

## 2025-03-17 VITALS
BODY MASS INDEX: 21.09 KG/M2 | WEIGHT: 119 LBS | TEMPERATURE: 96.5 F | OXYGEN SATURATION: 100 % | HEIGHT: 63 IN | SYSTOLIC BLOOD PRESSURE: 120 MMHG | DIASTOLIC BLOOD PRESSURE: 85 MMHG | RESPIRATION RATE: 17 BRPM | HEART RATE: 89 BPM

## 2025-03-17 PROBLEM — L98.499: Status: ACTIVE | Noted: 2025-03-17

## 2025-03-17 LAB
ALBUMIN SERPL BCP-MCNC: 3.8 G/DL (ref 3.4–5)
ALP SERPL-CCNC: 60 U/L (ref 33–110)
ALT SERPL W P-5'-P-CCNC: 13 U/L (ref 7–45)
ANION GAP BLDV CALCULATED.4IONS-SCNC: 10 MMOL/L (ref 10–25)
ANION GAP SERPL CALC-SCNC: 11 MMOL/L (ref 10–20)
AST SERPL W P-5'-P-CCNC: 12 U/L (ref 9–39)
BASE EXCESS BLDV CALC-SCNC: -2.3 MMOL/L (ref -2–3)
BASOPHILS # BLD AUTO: 0.03 X10*3/UL (ref 0–0.1)
BASOPHILS NFR BLD AUTO: 0.3 %
BILIRUB SERPL-MCNC: 0.2 MG/DL (ref 0–1.2)
BODY TEMPERATURE: 37 DEGREES CELSIUS
BUN SERPL-MCNC: 12 MG/DL (ref 6–23)
CA-I BLDV-SCNC: 1.17 MMOL/L (ref 1.1–1.33)
CALCIUM SERPL-MCNC: 8.7 MG/DL (ref 8.6–10.6)
CHLORIDE BLDV-SCNC: 109 MMOL/L (ref 98–107)
CHLORIDE SERPL-SCNC: 111 MMOL/L (ref 98–107)
CO2 SERPL-SCNC: 22 MMOL/L (ref 21–32)
CREAT SERPL-MCNC: 0.43 MG/DL (ref 0.5–1.05)
CRP SERPL-MCNC: 0.8 MG/DL
EGFRCR SERPLBLD CKD-EPI 2021: >90 ML/MIN/1.73M*2
EOSINOPHIL # BLD AUTO: 0.1 X10*3/UL (ref 0–0.7)
EOSINOPHIL NFR BLD AUTO: 1.1 %
ERYTHROCYTE [DISTWIDTH] IN BLOOD BY AUTOMATED COUNT: 12.2 % (ref 11.5–14.5)
ERYTHROCYTE [SEDIMENTATION RATE] IN BLOOD BY WESTERGREN METHOD: 23 MM/H (ref 0–20)
GLUCOSE BLDV-MCNC: 96 MG/DL (ref 74–99)
GLUCOSE SERPL-MCNC: 101 MG/DL (ref 74–99)
HCO3 BLDV-SCNC: 22.5 MMOL/L (ref 22–26)
HCT VFR BLD AUTO: 41.8 % (ref 36–46)
HCT VFR BLD EST: 44 % (ref 36–46)
HGB BLD-MCNC: 14.3 G/DL (ref 12–16)
HGB BLDV-MCNC: 14.8 G/DL (ref 12–16)
HIV 1+2 AB+HIV1 P24 AG SERPL QL IA: NONREACTIVE
HOLD SPECIMEN: NORMAL
IMM GRANULOCYTES # BLD AUTO: 0.04 X10*3/UL (ref 0–0.7)
IMM GRANULOCYTES NFR BLD AUTO: 0.5 % (ref 0–0.9)
INHALED O2 CONCENTRATION: 21 %
LACTATE BLDV-SCNC: 0.8 MMOL/L (ref 0.4–2)
LYMPHOCYTES # BLD AUTO: 3.28 X10*3/UL (ref 1.2–4.8)
LYMPHOCYTES NFR BLD AUTO: 37.6 %
MCH RBC QN AUTO: 30.9 PG (ref 26–34)
MCHC RBC AUTO-ENTMCNC: 34.2 G/DL (ref 32–36)
MCV RBC AUTO: 90 FL (ref 80–100)
MONOCYTES # BLD AUTO: 0.48 X10*3/UL (ref 0.1–1)
MONOCYTES NFR BLD AUTO: 5.5 %
NEUTROPHILS # BLD AUTO: 4.8 X10*3/UL (ref 1.2–7.7)
NEUTROPHILS NFR BLD AUTO: 55 %
NRBC BLD-RTO: 0 /100 WBCS (ref 0–0)
OXYHGB MFR BLDV: 79.2 % (ref 45–75)
PCO2 BLDV: 38 MM HG (ref 41–51)
PH BLDV: 7.38 PH (ref 7.33–7.43)
PLATELET # BLD AUTO: 298 X10*3/UL (ref 150–450)
PO2 BLDV: 49 MM HG (ref 35–45)
POTASSIUM BLDV-SCNC: 4.2 MMOL/L (ref 3.5–5.3)
POTASSIUM SERPL-SCNC: 4 MMOL/L (ref 3.5–5.3)
PREGNANCY TEST URINE, POC: NEGATIVE
PROT SERPL-MCNC: 7 G/DL (ref 6.4–8.2)
RBC # BLD AUTO: 4.63 X10*6/UL (ref 4–5.2)
SAO2 % BLDV: 81 % (ref 45–75)
SODIUM BLDV-SCNC: 137 MMOL/L (ref 136–145)
SODIUM SERPL-SCNC: 140 MMOL/L (ref 136–145)
TREPONEMA PALLIDUM IGG+IGM AB [PRESENCE] IN SERUM OR PLASMA BY IMMUNOASSAY: REACTIVE
WBC # BLD AUTO: 8.7 X10*3/UL (ref 4.4–11.3)

## 2025-03-17 PROCEDURE — 84132 ASSAY OF SERUM POTASSIUM: CPT | Mod: 59

## 2025-03-17 PROCEDURE — 86318 IA INFECTIOUS AGENT ANTIBODY: CPT

## 2025-03-17 PROCEDURE — 86780 TREPONEMA PALLIDUM: CPT

## 2025-03-17 PROCEDURE — RXMED WILLOW AMBULATORY MEDICATION CHARGE

## 2025-03-17 PROCEDURE — 71260 CT THORAX DX C+: CPT

## 2025-03-17 PROCEDURE — 76981 USE PARENCHYMA: CPT | Mod: LT,59

## 2025-03-17 PROCEDURE — 96375 TX/PRO/DX INJ NEW DRUG ADDON: CPT | Mod: 59

## 2025-03-17 PROCEDURE — 96361 HYDRATE IV INFUSION ADD-ON: CPT

## 2025-03-17 PROCEDURE — 2500000004 HC RX 250 GENERAL PHARMACY W/ HCPCS (ALT 636 FOR OP/ED): Mod: SE

## 2025-03-17 PROCEDURE — 86140 C-REACTIVE PROTEIN: CPT

## 2025-03-17 PROCEDURE — 2550000001 HC RX 255 CONTRASTS: Mod: SE | Performed by: EMERGENCY MEDICINE

## 2025-03-17 PROCEDURE — 99221 1ST HOSP IP/OBS SF/LOW 40: CPT

## 2025-03-17 PROCEDURE — G0378 HOSPITAL OBSERVATION PER HR: HCPCS

## 2025-03-17 PROCEDURE — 36415 COLL VENOUS BLD VENIPUNCTURE: CPT

## 2025-03-17 PROCEDURE — 2500000001 HC RX 250 WO HCPCS SELF ADMINISTERED DRUGS (ALT 637 FOR MEDICARE OP): Mod: SE

## 2025-03-17 PROCEDURE — 96367 TX/PROPH/DG ADDL SEQ IV INF: CPT

## 2025-03-17 PROCEDURE — 87040 BLOOD CULTURE FOR BACTERIA: CPT

## 2025-03-17 PROCEDURE — 85025 COMPLETE CBC W/AUTO DIFF WBC: CPT

## 2025-03-17 PROCEDURE — 81025 URINE PREGNANCY TEST: CPT

## 2025-03-17 PROCEDURE — 96366 THER/PROPH/DIAG IV INF ADDON: CPT

## 2025-03-17 PROCEDURE — 87081 CULTURE SCREEN ONLY: CPT

## 2025-03-17 PROCEDURE — 84132 ASSAY OF SERUM POTASSIUM: CPT

## 2025-03-17 PROCEDURE — 1210000001 HC SEMI-PRIVATE ROOM DAILY

## 2025-03-17 PROCEDURE — 85652 RBC SED RATE AUTOMATED: CPT

## 2025-03-17 PROCEDURE — 76641 ULTRASOUND BREAST COMPLETE: CPT | Mod: LT

## 2025-03-17 PROCEDURE — 96365 THER/PROPH/DIAG IV INF INIT: CPT | Mod: 59

## 2025-03-17 PROCEDURE — 76642 ULTRASOUND BREAST LIMITED: CPT | Mod: LEFT SIDE | Performed by: STUDENT IN AN ORGANIZED HEALTH CARE EDUCATION/TRAINING PROGRAM

## 2025-03-17 PROCEDURE — 87389 HIV-1 AG W/HIV-1&-2 AB AG IA: CPT

## 2025-03-17 RX ORDER — ACETAMINOPHEN 325 MG/1
650 TABLET ORAL EVERY 6 HOURS PRN
Status: DISCONTINUED | OUTPATIENT
Start: 2025-03-17 | End: 2025-03-17 | Stop reason: HOSPADM

## 2025-03-17 RX ORDER — VANCOMYCIN HYDROCHLORIDE 1 G/200ML
1000 INJECTION, SOLUTION INTRAVENOUS ONCE
Status: COMPLETED | OUTPATIENT
Start: 2025-03-17 | End: 2025-03-17

## 2025-03-17 RX ORDER — CEFAZOLIN SODIUM 2 G/100ML
2 INJECTION, SOLUTION INTRAVENOUS EVERY 8 HOURS
Status: DISCONTINUED | OUTPATIENT
Start: 2025-03-17 | End: 2025-03-17 | Stop reason: HOSPADM

## 2025-03-17 RX ORDER — DOXYCYCLINE 100 MG/1
100 TABLET ORAL 2 TIMES DAILY
Qty: 10 TABLET | Refills: 0 | Status: SHIPPED | OUTPATIENT
Start: 2025-03-17 | End: 2025-03-22

## 2025-03-17 RX ORDER — CLOBETASOL PROPIONATE 0.5 MG/G
CREAM TOPICAL 2 TIMES DAILY
Status: DISCONTINUED | OUTPATIENT
Start: 2025-03-17 | End: 2025-03-17 | Stop reason: HOSPADM

## 2025-03-17 RX ORDER — POLYETHYLENE GLYCOL 3350 17 G/17G
17 POWDER, FOR SOLUTION ORAL DAILY
Status: DISCONTINUED | OUTPATIENT
Start: 2025-03-17 | End: 2025-03-17 | Stop reason: HOSPADM

## 2025-03-17 RX ORDER — KETOROLAC TROMETHAMINE 15 MG/ML
15 INJECTION, SOLUTION INTRAMUSCULAR; INTRAVENOUS ONCE
Status: COMPLETED | OUTPATIENT
Start: 2025-03-17 | End: 2025-03-17

## 2025-03-17 RX ORDER — CYANOCOBALAMIN (VITAMIN B-12) 500 MCG
1 TABLET ORAL NIGHTLY PRN
Status: DISCONTINUED | OUTPATIENT
Start: 2025-03-17 | End: 2025-03-17 | Stop reason: HOSPADM

## 2025-03-17 RX ADMIN — CLOBETASOL PROPIONATE: 0.5 CREAM TOPICAL at 09:59

## 2025-03-17 RX ADMIN — CEFAZOLIN SODIUM 2 G: 2 INJECTION, SOLUTION INTRAVENOUS at 08:49

## 2025-03-17 RX ADMIN — PIPERACILLIN SODIUM AND TAZOBACTAM SODIUM 3.38 G: 3; .375 INJECTION, SOLUTION INTRAVENOUS at 02:28

## 2025-03-17 RX ADMIN — SODIUM CHLORIDE, POTASSIUM CHLORIDE, SODIUM LACTATE AND CALCIUM CHLORIDE 1000 ML: 600; 310; 30; 20 INJECTION, SOLUTION INTRAVENOUS at 01:47

## 2025-03-17 RX ADMIN — IOHEXOL 60 ML: 350 INJECTION, SOLUTION INTRAVENOUS at 03:42

## 2025-03-17 RX ADMIN — KETOROLAC TROMETHAMINE 15 MG: 15 INJECTION, SOLUTION INTRAMUSCULAR; INTRAVENOUS at 01:48

## 2025-03-17 RX ADMIN — VANCOMYCIN HYDROCHLORIDE 1000 MG: 1 INJECTION, SOLUTION INTRAVENOUS at 03:52

## 2025-03-17 ASSESSMENT — PAIN SCALES - GENERAL: PAINLEVEL_OUTOF10: 3

## 2025-03-17 NOTE — DISCHARGE SUMMARY
Discharge Diagnosis  Skin ulcer of female breast (Multi)    Issues Requiring Follow-Up  - Continue doxycycline 100mg BID for 5 day course  - Follow-up outpatient with OBGYN for further monitoring of breast cellulitis   - Follow-up outpatient with dermatology for further management of eczema    Discharge Meds     Medication List      START taking these medications     doxycycline 100 mg tablet; Commonly known as: Adoxa; Take 1 tablet (100   mg) by mouth 2 times a day for 5 days. Take with a full glass of water and   do not lie down for at least 30 minutes after     CONTINUE taking these medications     Banophen 25 mg tablet; Generic drug: diphenhydrAMINE; Take 1 tablet (25   mg) by mouth as needed at bedtime for sleep.   clobetasol 0.05 % cream; Commonly known as: Temovate; Apply topically 2   times a day. To arms for 2 weeks. Avoid on face, skin folds, genitals.   guaiFENesin 600 mg 12 hr tablet; Commonly known as: Mucinex   ibuprofen 600 mg tablet; Take 1 tablet (600 mg) by mouth every 6 hours   if needed for mild pain (1 - 3) or moderate pain (4 - 6).   melatonin 1 mg tablet; Take 1 tablet (1 mg) by mouth as needed at   bedtime (insomnia).   NIFEdipine ER 30 mg 24 hr tablet; Commonly known as: Adalat CC; Take 1   tablet (30 mg) by mouth once daily in the morning. Take before meals. Do   not crush, chew, or split.   polyethylene glycol 17 gram packet; Commonly known as: Glycolax,   Miralax; Take 17 g by mouth 2 times a day as needed (constipation).   traMADol 50 mg tablet; Commonly known as: Ultram   traZODone 100 mg tablet; Commonly known as: Desyrel       Test Results Pending At Discharge  Pending Labs       Order Current Status    RPR In process    Staphylococcus aureus/MRSA colonization, Culture In process    Blood Culture Preliminary result    Blood Culture Preliminary result            Hospital Course  Leigh Ann Joy is a 29 y.o. female with PMHx of epilepsy (not on AEDs) and substance use who was admitted  for management of L breast infection. Initial vitals stable and patient without constitutional symptoms. Labs also unremarkable aside from elevated ESR (23), but CRP WNL. On exam L breast had an ulcerated area without pus and small amount of underlying erythema. CT chest demonstrating asymmetric thickening of the left breast without associated fluid collection or gas. US similarly demonstrated soft tissue infection without underlying abscess. Patient symptoms have been improving since infection site began draining prior to presentation. Signs, symptoms, and workup most consistent with L breast cellulitis. Patient was given one dose of vanc/zosyn in the ED. Discharged on doxycycline for a 5 day course.        Outpatient Follow-Up  No future appointments.      Neeraj Varghese MD

## 2025-03-17 NOTE — PROGRESS NOTES
Leigh Ann Joy is a 29 y.o. female on day 0 of admission presenting with No Principal Problem: There is no principal problem currently on the Problem List. Please update the Problem List and refresh..    Subjective   ***       Objective     Physical Exam    Last Recorded Vitals  not currently breastfeeding.  Intake/Output last 3 Shifts:  No intake/output data recorded.    Relevant Results  {If you would like to pull in Medications, type .meds     If you would like to pull in Lab results for the last 24 hours, type .[id number]    If you would like to pull in Imaging results, type .imgrslt :99}    {Link to Stroke Scoring tools - Link :99}                        Assessment/Plan   Assessment & Plan    ***     {This patient does not have an ACP note on file for this encounter, please fill one out - Advance Care Planning Activity :99}      Carol Cherry MT

## 2025-03-17 NOTE — H&P
"MEDICINE ADMISSION NOTE    History of Present Illness  Leigh Ann Joy is a 29 y.o. female with PMHx of epilepsy (not on AEDs) and substance use who was admitted for management of L breast infection.    Patient states that she first began experiencing L breast pain about 1 week ago with an associated superficial fluid collection. The pain was worsening over this time until yesterday the fluid collection popped open and has been draining yellow fluid since. Her symptoms  have been improving since it started draining. Now rates are pain at a 6/10 and describes it as throbbing. Otherwise denies fevers, chills, lightheadedness, dizziness, chest pain, shortness of breath, palpitations, abdominal pain, N/V/C/D, dysuria, and lower extremity edema. She denies current breast feeding, trauma to the breast, or recent IV drug use. States that her last drug use was crack about 1 month ago.     In the ED, patient was hemodynamically stable. Labs were notable for elevated ESR (23) but otherwise unremarkable. CT chest demonstrated asymmetric skin thickening of the left breast around the areola with several enlarged left axillary lymph nodes. She was administered a dose ov vanc/zosyn.    ED Course:  BP 96/62   Pulse 90   Temp 35.8 °C (96.5 °F)   Resp 20   Ht 1.6 m (5' 3\")   Wt 54 kg (119 lb)   SpO2 100%   BMI 21.08 kg/m²      Labs:     CBC: WBC 8.7 , HGB 14.3,   BMP: , K 4.0, Cl 111, HCO3 22, BUN 12, CR 0.43, Glu 101  LFTS: AST 12 , ALT 13, ALKPHOS 60 , TBILI 0.2     EKG  No results found for this or any previous visit (from the past 4464 hours).     Imaging:  CT chest w IV contrast    Result Date: 3/17/2025  Interpreted By:  Myesha Boykin, STUDY: CT CHEST W IV CONTRAST;  3/17/2025 3:53 am   INDICATION: Signs/Symptoms:breast ulcer.     COMPARISON: None.   ACCESSION NUMBER(S): [id number]   ORDERING CLINICIAN: ALISTAIR LÓPEZ   TECHNIQUE: Helical data acquisition of the chest was obtained following intravenous " administration of 60 mL Omnipaque 350.  Images were reformatted in axial, coronal, and sagittal planes.   FINDINGS: LUNGS AND AIRWAYS: The trachea and central airways are patent. No endobronchial lesion is seen.   The bilateral lungs are clear without evidence of focal consolidation, pleural effusion, or pneumothorax.   MEDIASTINUM AND JEM, LOWER NECK AND AXILLA: The visualized thyroid gland is within normal limits. Borderline enlarged 1 cm left axillary lymph node is present (series 202, image 48) in the left axilla. Additional 1.8 cm lymph node is present in the left axilla (series 205, image 67). No enlarged mediastinal lymphadenopathy is present. Esophagus appears within normal limits as seen.   HEART AND VESSELS: The thoracic aorta normal in course and caliber.Pulmonary arteries are unremarkable in appearance.Although, the study is not tailored for evaluation of aorta, there is no definite evidence of acute aortic pathology. Main pulmonary artery and its branches are normal in caliber. No coronary artery calcifications are seen. Please note, the study is not optimized for evaluation of coronary arteries. The cardiac chambers are not enlarged. There is no pericardial effusion seen.   UPPER ABDOMEN: Several radiolucent stones are visualized in the gallbladder with mild gallbladder distention but no evidence of wall thickening or pericholecystic stranding.   CHEST WALL AND OSSEOUS STRUCTURES: There is asymmetric skin thickening in the left breast around the left areola, without evidence of underlying fat stranding or fluid collections. No soft tissue gas is identified.   No acute osseous pathology.There are no suspicious osseous lesions.Mild multilevel degenerative changes are present in the thoracic spine without compression fracture or high-grade stenosis.       1.  Asymmetric skin thickening of the left breast around the areola without evidence of the associated fluid collection or soft tissue gas. This is  incompletely characterized on current exam, however underlying breast neoplasm/malignancy is not excluded. Further evaluation is recommended under of the direct supervision of breast radiologist with a dedicated ultrasound/mammogram during the daytime hours. 2. Several enlarged left axillary lymph nodes are incompletely characterized. 3. No evidence of acute intrathoracic abnormality. 4. Radiolucent stones are present in the gallbladder without evidence of wall thickening or pericholecystic stranding.   Signed by: Myesha Boykin 3/17/2025 4:11 AM Dictation workstation:   [id number]      ED Interventions:  Medications   polyethylene glycol (Glycolax, Miralax) packet 17 g (17 g oral Not Given 3/17/25 0830)   clobetasol (Temovate) 0.05 % cream ( Topical Given 3/17/25 0959)   melatonin tablet 1 mg (has no administration in time range)   acetaminophen (Tylenol) tablet 650 mg (has no administration in time range)   ceFAZolin (Ancef) 2 g in dextrose (iso)  mL (0 g intravenous Stopped 3/17/25 1041)   lactated Ringer's bolus 1,000 mL (0 mL intravenous Stopped 3/17/25 0247)   vancomycin (Vancocin) 1,000 mg in dextrose 5%  mL (0 mg intravenous Stopped 3/17/25 0452)   piperacillin-tazobactam (Zosyn) 3.375 g in dextrose (iso) IV 50 mL (0 g intravenous Stopped 3/17/25 0258)   ketorolac (Toradol) injection 15 mg (15 mg intravenous Given 3/17/25 0148)   iohexol (OMNIPaque) 350 mg iodine/mL solution 60 mL (60 mL intravenous Given 3/17/25 0342)     Result Date: 3/17/2025  Interpreted By:  Myesha Boykin, STUDY: CT CHEST W IV CONTRAST;  3/17/2025 3:53 am   INDICATION: Signs/Symptoms:breast ulcer.     COMPARISON: None.   ACCESSION NUMBER(S): [id number]   ORDERING CLINICIAN: ALISTAIR LÓPEZ   TECHNIQUE: Helical data acquisition of the chest was obtained following intravenous administration of 60 mL Omnipaque 350.  Images were reformatted in axial, coronal, and sagittal planes.   FINDINGS: LUNGS AND AIRWAYS: The  trachea and central airways are patent. No endobronchial lesion is seen.   The bilateral lungs are clear without evidence of focal consolidation, pleural effusion, or pneumothorax.   MEDIASTINUM AND JEM, LOWER NECK AND AXILLA: The visualized thyroid gland is within normal limits. Borderline enlarged 1 cm left axillary lymph node is present (series 202, image 48) in the left axilla. Additional 1.8 cm lymph node is present in the left axilla (series 205, image 67). No enlarged mediastinal lymphadenopathy is present. Esophagus appears within normal limits as seen.   HEART AND VESSELS: The thoracic aorta normal in course and caliber.Pulmonary arteries are unremarkable in appearance.Although, the study is not tailored for evaluation of aorta, there is no definite evidence of acute aortic pathology. Main pulmonary artery and its branches are normal in caliber. No coronary artery calcifications are seen. Please note, the study is not optimized for evaluation of coronary arteries. The cardiac chambers are not enlarged. There is no pericardial effusion seen.   UPPER ABDOMEN: Several radiolucent stones are visualized in the gallbladder with mild gallbladder distention but no evidence of wall thickening or pericholecystic stranding.   CHEST WALL AND OSSEOUS STRUCTURES: There is asymmetric skin thickening in the left breast around the left areola, without evidence of underlying fat stranding or fluid collections. No soft tissue gas is identified.   No acute osseous pathology.There are no suspicious osseous lesions.Mild multilevel degenerative changes are present in the thoracic spine without compression fracture or high-grade stenosis.       1.  Asymmetric skin thickening of the left breast around the areola without evidence of the associated fluid collection or soft tissue gas. This is incompletely characterized on current exam, however underlying breast neoplasm/malignancy is not excluded. Further evaluation is recommended  under of the direct supervision of breast radiologist with a dedicated ultrasound/mammogram during the daytime hours. 2. Several enlarged left axillary lymph nodes are incompletely characterized. 3. No evidence of acute intrathoracic abnormality. 4. Radiolucent stones are present in the gallbladder without evidence of wall thickening or pericholecystic stranding.   Signed by: Myesha Boykin 3/17/2025 4:11 AM Dictation workstation:   [id number]        Past Medical History  History reviewed. No pertinent past medical history.    Surgical History  Past Surgical History:   Procedure Laterality Date    INDUCED       OTHER SURGICAL HISTORY  2014    Reported Prior Surgical / Procedural History       Social History  Tobacco: Currently smoking 0.5ppd. Smoking history 20 years, maximum 1ppd  Alcohol: Denies  Drugs: Crack (last use 1 month ago), marijuana    Family History  Family History   Problem Relation Name Age of Onset    Epilepsy Father          Allergies  Cheese     Physical Exam   Constitutional: Well-appearing, no acute distress  HEENT: Normocephalic, atraumatic  Cardiovascular: Regular rate and rhythm, no murmurs/rubs/gallops  Pulmonary: Clear to auscultation bilaterally, no wheezes/crackles/rales. Nonlabored work of breathing. On room air  Abdominal: Soft, nontender, nondistended, no rebound/guarding  Extremities: No peripheral edema. Warm to touch  Skin: L breast wound. Maculopapular patches along bilateral forearms  Neuro: No focal deficits. Aox4  Psych: Appropriate mood and affect.      Medications  Scheduled medications  ceFAZolin, 2 g, intravenous, q8h  clobetasol, , Topical, BID  polyethylene glycol, 17 g, oral, Daily      Continuous medications     PRN medications  PRN medications: acetaminophen, melatonin      Assessment/Plan   Leigh Ann Joy is a 29 y.o. female with PMHx of epilepsy (not on AEDs) and substance use who was admitted for management of L breast infection. Initial vitals  stable and patient without constitutional symptoms. Labs also unremarkable aside from elevated ESR (23), but CRP WNL. CT chest demonstrating asymmetric thickening of the left breast without associated fluid collection or gas. US similarly demonstrated soft tissue infection without underlying abscess. Patient symptoms have been improving since infection site began draining yesterday. Signs, symptoms, and workup most consistent with L breast cellulitis. Patient was given one dose of vanc/zosyn in the ED. Should be ok to discharge on doxycycline for a 5 day course.    #L breast cellulitis  :: No consitutional symptoms  :: ESR mildly elevated but CRP and WBC WNL  :: Symptoms improving since drainage yesterday  :: S/p Vanc/Zosyn x1 in ED  :: CT chest with l breast thickening around the site of cellulitis  - Breast US ordered without evidence of deeper abscess or malignancy   - Will switch abx to doxycycline for 5 day course  - Will reach out to outpatient OBGYN provider to help set up outpatient follow-up appointment    #Eczema  :: Rashes on forearms consistent with eczema and with patient's prior history  - Outpatient derm referral ordered  - Topical clobetasol PRN    #Hx Epilepsy  :: Not on home AEDs  :: Has not had a seizure in ~20 years, per patient    F : PRN  E: PRN  N: Adult diet Regular  A:  PIVs  DVT prophylaxis: None, patient is low risk and ambulating    Code Status: Full Code (confirmed on admission)   Emergency contact: Lars Corrales (phone number not available at the time of admission)       Nereaj Varghese MD  Internal Medicine, PGY-1

## 2025-03-17 NOTE — ED PROVIDER NOTES
History of Present Illness     History provided by: Patient  Limitations to History: None  External Records Reviewed with Brief Summary: Discharge Summary from 2024 which showed vaginal discharge    HPI:  Leigh Ann Joy is a 29 y.o. female with a past medical history of substance use who is presenting with a left breast ulcer. Patient reports two weeks of drainage from the breast. Denies fevers and chills. Reports surrounding erythema. Denies fevers and chills. Patient reports that the same thing happened on her right breast and she had a mastectomy afterwards. Denies IV drug use.      Physical Exam   Triage vitals:  T 35.8 °C (96.5 °F)  HR 86  /77  RR 20  O2 98 %      General: Awake, alert, in no acute distress  Eyes: Gaze conjugate.  No scleral icterus or injection  HENT: Normo-cephalic, atraumatic. No stridor  CV: Regular rate, regular rhythm. Radial pulses 2+ bilaterally  Resp: Breathing non-labored, speaking in full sentences.    GI: non-distended,   MSK/Extremities: No gross bony deformities. Moving all extremities  Skin: Warm. Appropriate color. Erythema over the bilateral arms. Left breast ulcer with surrounding erythema    Neuro: Alert. Oriented. Face symmetric. Speech is fluent.  Gross strength and sensation intact in b/l UE and LEs  Psych: Appropriate mood and affect      Medical Decision Making & ED Course   Medical Decision Makin y.o. female with a past medical history of substance use who is presenting today with a breast officer. Vital signs within normal limits. Considered the differential of cellulitis, MRSA, underlying abscess, breasts ulcer concerning for malignancy. Will obtain lab work including a CBC, CMP, blood cultures, ESR and CRP. Patient was covered with vancomycin and Zosyn. Did also consider HIV and syphilis given the recurrence of these infections. Will also obtain a CT abdomen pelvis to assess for underlying abscess. Lab work was reviewed it showed a normal  lactate. No leukocytosis. CMP within normal limits. ESR was elevated. CT imaging showed concern for cellulitis versus breast cancer. Patient will require admission for further management evaluation given concern for cellulitis versus potential malignancy.  ----      Differential diagnoses considered include but are not limited to: Please see MDM.     Social Determinants of Health which Significantly Impact Care: None identified     EKG Independent Interpretation: If an EKG interpretation is available. Please see ED Course and MDM for full interpretation.    Independent Result Review and Interpretation: Results were independently reviewed and interpreted by myself. Please see ED course and MDM for full interpretation.    Chronic conditions affecting the patient's care: As documented in the MDM    The patient was discussed with the following consultants/services: None    Care Considerations: As per [facility]    ED Course:  ED Course as of 03/18/25 2149   Mon Mar 17, 2025   0137 POCT Lactate, Venous: 0.8 [TYRELL]      ED Course User Index  [TYRELL] Vale Anderson MD         Diagnoses as of 03/18/25 2149   Skin ulcer of female breast (Multi)     Disposition   As a result of their workup, the patient will require admission to the hospital.  The patient was informed of her diagnosis.  The patient was given the opportunity to ask questions and I answered them. The patient agreed to be admitted to the hospital.    Procedures   Procedures    Patient seen and discussed with ED attending physician.    Vale Anderson MD  Emergency Medicine     Vale Anderson MD  Resident  03/18/25 6613

## 2025-03-17 NOTE — DISCHARGE INSTRUCTIONS
David Beltrán,    You were admitted to the hospital with an infection of your left breast. Imaging was taken to rule out deeper infections, fluid collections, or malignancy. It was determined that your workup with a superficial skin infection which will be treated with an oral antibiotic (Doxycyline) which you will take twice a day for 5 days. We also reached out to help set up an appointment with your OBGYN - they should call you to schedule an appointment. We also placed a referral for you to see a dermatologist for further management of your eczema.    Please contact a doctor if you have worsening of symptoms including but not limited to fevers, chills, increasing drainage or foul-smelling drainage from your infection site, or increasing pain.    Best regards,  Your [facility] care team

## 2025-03-17 NOTE — HOSPITAL COURSE
Leigh Ann Joy is a 29 y.o. female with PMHx of epilepsy (not on AEDs) and substance use who was admitted for management of L breast infection. Initial vitals stable and patient without constitutional symptoms. Labs also unremarkable aside from elevated ESR (23), but CRP WNL. On exam L breast had an ulcerated area without pus and small amount of underlying erythema. CT chest demonstrating asymmetric thickening of the left breast without associated fluid collection or gas. US similarly demonstrated soft tissue infection without underlying abscess. Patient symptoms have been improving since infection site began draining prior to presentation. Signs, symptoms, and workup most consistent with L breast cellulitis. Patient was given one dose of vanc/zosyn in the ED. Discharged on doxycycline for a 5 day course.

## 2025-03-18 LAB
RPR SER QL: NONREACTIVE
STAPHYLOCOCCUS SPEC CULT: NORMAL

## 2025-03-19 LAB — T PALLIDUM AB SER QL AGGL: REACTIVE

## 2025-03-21 LAB
BACTERIA BLD CULT: NORMAL
BACTERIA BLD CULT: NORMAL

## 2025-03-26 ENCOUNTER — CLINICAL SUPPORT (OUTPATIENT)
Facility: HOSPITAL | Age: 30
End: 2025-03-26
Payer: MEDICAID

## 2025-03-26 NOTE — PROGRESS NOTES
Food For Life  Diet Recommendation 1: Pregnancy and Breastfeeding  Diet Recommendation 2: Sodium, Low  Diet Recommendation 3: Healthy Eating  Food Intolerance Avoidance: NKFA. (Partner does not eat pork)  Household Size: 3 Family Members  Interventions: Referral Number: 1st 6 Mo Referral 6 Mos  Interventions: Visit Number: 5 of 6 Visits - Max 6 Visits/Referral Each 6 Mo Period  Education Today: My[employer] Meals  Grains: 25-50% Whole  Fruit: Fresh - 100%  Vegetables: Fresh - 100%  Proteins: 1-2 Plant-based Items  Dairy: 50-75% Lowfat  Originating Site of Referral Order: [facility]  Initials of RD Assisting Today: MB

## 2025-04-17 ENCOUNTER — APPOINTMENT (OUTPATIENT)
Dept: DERMATOLOGY | Facility: CLINIC | Age: 30
End: 2025-04-17
Payer: MEDICAID

## 2025-04-21 ENCOUNTER — CLINICAL SUPPORT (OUTPATIENT)
Facility: HOSPITAL | Age: 30
End: 2025-04-21
Payer: MEDICAID

## 2025-04-21 NOTE — PROGRESS NOTES
Food For Life  Diet Recommendation 1: Pregnancy and Breastfeeding  Diet Recommendation 2: Sodium, Low  Diet Recommendation 3: Healthy Eating  Food Intolerance Avoidance: NKFA  Household Size: 3 Family Members  Interventions: Referral Number: 1st 6 Mo Referral 6 Mos  Interventions: Visit Number: 6 of 6 Visits - Max 6 Visits/Referral Each 6 Mo Period  Grains: 25-50% Whole  Fruit: % Fresh  Vegetables: % Fresh  Proteins: 3 Plant-based Items  Dairy: Lowfat - 100%  Originating Site of Referral Order: [facility]  Initials of RD Assisting Today: ANGELITO

## 2025-04-25 DIAGNOSIS — O09.93 HIGH-RISK PREGNANCY IN THIRD TRIMESTER (HHS-HCC): Primary | ICD-10-CM

## 2025-04-25 DIAGNOSIS — Z59.41 FOOD INSECURITY: ICD-10-CM

## 2025-04-25 SDOH — ECONOMIC STABILITY - FOOD INSECURITY: FOOD INSECURITY: Z59.41

## 2025-05-15 ENCOUNTER — SOCIAL WORK (OUTPATIENT)
Dept: PEDIATRICS | Facility: CLINIC | Age: 30
End: 2025-05-15
Payer: MEDICAID

## 2025-07-28 ENCOUNTER — CLINICAL SUPPORT (OUTPATIENT)
Facility: HOSPITAL | Age: 30
End: 2025-07-28
Payer: MEDICAID

## 2025-07-28 NOTE — PROGRESS NOTES
Food For Life  Diet Recommendation 1: MyPlate  Diet Recommendation 2: Sodium, Low  Diet Recommendation 3: Healthy Eating  Food Intolerance Avoidance: NKFA  Household Size: 3 Family Members  Interventions: Referral Number: 2nd 6 Mo Referral 1 yr (Referrals may not be consecutive)  Interventions: Visit Number: 1 of 6 Visits - Max 6 Visits/Referral Each 6 Mo Period  Follow Up Notes for Future Visits: Referral good until October  Grains: 0-25% Whole  Fruit: 25-50% Fresh  Vegetables: % Fresh  Proteins: 3 Plant-based Items  Dairy: 50-75% Lowfat  Originating Site of Referral Order: Dr. Carol Oconnor  Initials of RD Assisting Today: SENTHIL

## 2025-08-19 ENCOUNTER — CLINICAL SUPPORT (OUTPATIENT)
Facility: HOSPITAL | Age: 30
End: 2025-08-19
Payer: MEDICAID